# Patient Record
Sex: FEMALE | Race: WHITE | NOT HISPANIC OR LATINO | Employment: FULL TIME | URBAN - METROPOLITAN AREA
[De-identification: names, ages, dates, MRNs, and addresses within clinical notes are randomized per-mention and may not be internally consistent; named-entity substitution may affect disease eponyms.]

---

## 2017-03-01 ENCOUNTER — OFFICE VISIT (OUTPATIENT)
Dept: BARIATRICS/WEIGHT MGMT | Facility: CLINIC | Age: 47
End: 2017-03-01

## 2017-03-01 VITALS
RESPIRATION RATE: 16 BRPM | BODY MASS INDEX: 32.15 KG/M2 | HEIGHT: 65 IN | TEMPERATURE: 98.4 F | SYSTOLIC BLOOD PRESSURE: 114 MMHG | HEART RATE: 69 BPM | WEIGHT: 193 LBS | DIASTOLIC BLOOD PRESSURE: 82 MMHG

## 2017-03-01 DIAGNOSIS — Z71.82 EXERCISE COUNSELING: ICD-10-CM

## 2017-03-01 DIAGNOSIS — E66.9 OBESITY (BMI 30-39.9): Primary | ICD-10-CM

## 2017-03-01 DIAGNOSIS — R53.82 CHRONIC FATIGUE: ICD-10-CM

## 2017-03-01 DIAGNOSIS — R60.9 EDEMA, UNSPECIFIED TYPE: ICD-10-CM

## 2017-03-01 DIAGNOSIS — M25.50 MULTIPLE JOINT PAIN: ICD-10-CM

## 2017-03-01 DIAGNOSIS — K21.9 GASTROESOPHAGEAL REFLUX DISEASE, ESOPHAGITIS PRESENCE NOT SPECIFIED: ICD-10-CM

## 2017-03-01 DIAGNOSIS — Z71.3 DIETARY COUNSELING: ICD-10-CM

## 2017-03-01 PROCEDURE — 99212 OFFICE O/P EST SF 10 MIN: CPT | Performed by: NURSE PRACTITIONER

## 2017-03-01 NOTE — PROGRESS NOTES
[facility]ATR[facility] BARIATRIC SURGERY  [address]  [facility] [address]  [address]  [facility] [address]  Dept: [phone]  3/1/2017      Kymberly Edwards.  [id number]  [id number]  1970  female      Chief Complaint   Patient presents with   • Follow-up     s/p lapband c/o heartburn and fatigue       BH Post-Op Bariatric Surgery:   Kymberly Edwards is status post Lapband procedure (APS), performed on 06/30/14    HPI:   Today's weight is 193 lb (87.5 kg) pounds, today's BMI is Body mass index is 32.12 kg/(m^2). and she has a loss of 13 pounds since the last visit. The patient reports experiencing hunger, but decreased hunger and loss of appetite.     Kymberly Edwards denies nausea, dysphagia or abdominal pain. The patientdoes not have vomiting or regurgitation. The patientdoes not have heartburn/reflux- patient will have symptoms if she misses medication or eats certain foods.    Patient states their portion size is the small plate and their hunger is appropriate.    Diet and Exercise: Diet history reviewed and discussed with the patient. Weight loss/gains to date discussed with the patient. She reports eating 3-4 meals per day, a typical portion size of 1 cup, eating 1 snack per day, drinking 5 8-oz. glasses of water per day. The patient can tolerate solid protein.   The patient is eating protein first. The patient is limiting food volume. The patient is taking vitamins. The patient is limiting snacking. The patient is regular exercise- walking. She is not drinking carbonated beverages.     The following portions of the patient's history were reviewed and updated as appropriate: allergies, current medications, past family history, past medical history, past social history, past surgical history and problem list.    Review of Systems   Constitutional: Positive for fatigue.   Gastrointestinal:        Heartburn   All other systems reviewed and are  negative.      Vitals:    03/01/17 1328   BP: 114/82   Pulse: 69   Resp: 16   Temp: 98.4 °F (36.9 °C)       Physical Exam   Constitutional: She is oriented to person, place, and time. She appears well-developed and well-nourished.   HENT:   Head: Normocephalic and atraumatic.   Eyes: EOM are normal.   Cardiovascular: Normal rate.    Pulmonary/Chest: Effort normal.   Musculoskeletal: Normal range of motion.   Neurological: She is alert and oriented to person, place, and time.   Skin: Skin is warm and dry.   Psychiatric: She has a normal mood and affect. Her behavior is normal. Judgment and thought content normal.   Vitals reviewed.      Assessment: Post-operatively the patient is doing really well .     Plan:   Encouraged patient to keep up the great work. Increase exercise. Avoid foods/habits that contribute to reflux.     No adjustment today as patient has ideal level of restriction. RTC for n/v/d/regurg. Encouraged patient to be sure to eat plenty of dense lean protein and high fiber foods like vegetables and fresh fruits while reducing simple carbohydrates. Reviewed the importance of eating solid foods vs. soft which will contribute to weight gain. Discussed the recommended amount of water to intake daily- half of body weight in ounces. Not drinking with or right after meals.    Activity restrictions: None.   Instructions / Recommendations: dietary counseling recommended, recommended a daily protein intake of  grams, patient was advised that the lap band system works best when consuming solid foods, vitamin supplement(s) recommended, recommended exercising at least 150 minutes per week inclduing both cardio and strength training, behavior modifications recommended and instructed to call the office for concerns, questions, or problems.     The patient was instructed to follow up in 3 months      The patient was counseled regarding. Total time spent face to face was 10 minutes and more than half the time was  spent counseling.

## 2017-06-07 ENCOUNTER — OFFICE VISIT (OUTPATIENT)
Dept: BARIATRICS/WEIGHT MGMT | Facility: CLINIC | Age: 47
End: 2017-06-07

## 2017-06-07 VITALS
DIASTOLIC BLOOD PRESSURE: 77 MMHG | HEIGHT: 65 IN | HEART RATE: 77 BPM | TEMPERATURE: 98.6 F | BODY MASS INDEX: 29.82 KG/M2 | SYSTOLIC BLOOD PRESSURE: 129 MMHG | RESPIRATION RATE: 16 BRPM | WEIGHT: 179 LBS

## 2017-06-07 DIAGNOSIS — R11.2 NAUSEA AND VOMITING, INTRACTABILITY OF VOMITING NOT SPECIFIED, UNSPECIFIED VOMITING TYPE: ICD-10-CM

## 2017-06-07 DIAGNOSIS — Z71.3 DIETARY COUNSELING: ICD-10-CM

## 2017-06-07 DIAGNOSIS — E55.9 VITAMIN D DEFICIENCY: ICD-10-CM

## 2017-06-07 DIAGNOSIS — K21.9 GASTROESOPHAGEAL REFLUX DISEASE, ESOPHAGITIS PRESENCE NOT SPECIFIED: ICD-10-CM

## 2017-06-07 DIAGNOSIS — IMO0002 BODY MASS INDEX (BMI) OF 25.0 TO 29.9: Primary | ICD-10-CM

## 2017-06-07 DIAGNOSIS — F41.9 ANXIETY: ICD-10-CM

## 2017-06-07 DIAGNOSIS — Z71.82 EXERCISE COUNSELING: ICD-10-CM

## 2017-06-07 DIAGNOSIS — R60.9 EDEMA, UNSPECIFIED TYPE: ICD-10-CM

## 2017-06-07 PROCEDURE — S2083 ADJUSTMENT GASTRIC BAND: HCPCS | Performed by: NURSE PRACTITIONER

## 2017-06-07 NOTE — PROGRESS NOTES
[facility] BARIATRIC SURGERY  [address]  [facility] [address] [phone]  [address]  [facility] [id number] [phone]  Dept: [phone]  6/7/2017      Kymberly Edwards.  [id number]  [id number]  1970  female      Chief Complaint   Patient presents with   • Follow-up     s/p lapband c/o n/v and heartburn       BH Post-Op Bariatric Surgery:   Kymberly Edwards is status post Lapband procedure (APS), performed on 6/30/14.     HPI:   Today's weight is 179 lb (81.2 kg)  pounds, today's BMI is Body mass index is 29.79 kg/(m^2). and she has a loss of 14 pounds since the last visit. The patient reports decreased hunger and  loss of appetite.      The patient denies abdominal pain. The patientdoes have vomitng- reports that it has been weeks since this has happened. The patient does have reflux.    Diet and Exercise: Diet history reviewed and discussed with the patient. Weight loss/gains to date discussed with the patient. She reports eating 3 meals per day, a typical portion size of 1 cup, eating 1 snack per day, drinking 4-5 8-oz. glasses of water per day. The patient can tolerate solid protein- but only when chewed for an unreasonable long time.   The patient is eating protein first. The patient is limiting food volume. The patient is not taking vitamins. The patient is limiting snacking. The patient is exercising regularly- walking 3-4 days per week. She is not drinking carbonated beverages.     The following portions of the patient's history were reviewed and updated as appropriate: allergies, current medications, past family history, past medical history, past social history, past surgical history and problem list.    Vitals:    06/07/17 1320   BP: 129/77   Pulse: 77   Resp: 16   Temp: 98.6 °F (37 °C)       Review of Systems    Physical ExamAssessment: Post-operatively the patient is doing well with weight loss but is symptomatic of her band  having too much restriction.      Plan:    My impression is Kymberly Edwards has too much restriction of her band.  I think she would benefit from fluid removal from her band.  Under aseptic conditions, I accessed the port and removed 0.2cc to bring the total band volume to 5.5cc.  She was able to tolerate a glass of water at the conclusion of the fill.  She was advised to consume warm liquids for today and then soft solids for 24 hours before advancing back to a regular diet.   RTC for n/v/d/regurg.     Reviewed the importance of being able to tolerate dense/solid protein and vegetables for weight loss. Discussed eating foods that are easier to tolerate, softer foods, usually contribute to weight gain because they are higher calorie/carb and will not keep patient full for the recommended 3-4 hours.      She should follow-up in 4-6 weeks    UGI ordered today    Activity restrictions: None.   Instructions / Recommendations: dietary counseling recommended, recommended a daily protein intake of  grams, patient was advised that the lap band system works best when consuming solid foods, vitamin supplement(s) recommended, recommended exercising at least 150 minutes per week, behavior modifications recommended and instructed to call the office for concerns, questions, or problems.     The patient was counseled regarding solid protein vs liquid protein supplements, UGI, follow up. Total time of spent face to face was 14 minutes and more than half the time was spent counseling.

## 2017-06-07 NOTE — PATIENT INSTRUCTIONS
Patient to return to clinic for nausea, vomiting, dysphagia, regurgitation or heartburn/reflux. Reviewed tight band symptoms with patient. Encouraged them to increase their lean protein and decrease their carbohydrates. Be sure to drink plenty of water daily and eliminate any liquid calories. Recommended 150 minutes of exercise per week including both cardio and strength training.

## 2017-06-23 ENCOUNTER — HOSPITAL ENCOUNTER (OUTPATIENT)
Dept: GENERAL RADIOLOGY | Facility: HOSPITAL | Age: 47
Discharge: HOME | End: 2017-06-23
Admitting: NURSE PRACTITIONER

## 2017-06-23 DIAGNOSIS — K21.9 GASTROESOPHAGEAL REFLUX DISEASE, ESOPHAGITIS PRESENCE NOT SPECIFIED: ICD-10-CM

## 2017-06-23 DIAGNOSIS — Z71.82 EXERCISE COUNSELING: ICD-10-CM

## 2017-06-23 DIAGNOSIS — F41.9 ANXIETY: ICD-10-CM

## 2017-06-23 DIAGNOSIS — IMO0002 BODY MASS INDEX (BMI) OF 25.0 TO 29.9: ICD-10-CM

## 2017-06-23 DIAGNOSIS — R60.9 EDEMA, UNSPECIFIED TYPE: ICD-10-CM

## 2017-06-23 DIAGNOSIS — R11.2 NAUSEA AND VOMITING, INTRACTABILITY OF VOMITING NOT SPECIFIED, UNSPECIFIED VOMITING TYPE: ICD-10-CM

## 2017-06-23 DIAGNOSIS — E55.9 VITAMIN D DEFICIENCY: ICD-10-CM

## 2017-06-23 DIAGNOSIS — Z71.3 DIETARY COUNSELING: ICD-10-CM

## 2017-06-23 PROCEDURE — 74241: CPT

## 2017-07-10 ENCOUNTER — OFFICE VISIT (OUTPATIENT)
Dept: BARIATRICS/WEIGHT MGMT | Facility: CLINIC | Age: 47
End: 2017-07-10

## 2017-07-10 VITALS
SYSTOLIC BLOOD PRESSURE: 125 MMHG | HEIGHT: 65 IN | HEART RATE: 60 BPM | WEIGHT: 186 LBS | BODY MASS INDEX: 30.99 KG/M2 | DIASTOLIC BLOOD PRESSURE: 82 MMHG | RESPIRATION RATE: 16 BRPM | TEMPERATURE: 98.5 F

## 2017-07-10 DIAGNOSIS — R11.2 NAUSEA AND VOMITING, INTRACTABILITY OF VOMITING NOT SPECIFIED, UNSPECIFIED VOMITING TYPE: ICD-10-CM

## 2017-07-10 DIAGNOSIS — Z98.84 STATUS FOLLOWING GASTRIC BANDING SURGERY FOR WEIGHT LOSS: ICD-10-CM

## 2017-07-10 DIAGNOSIS — Z71.3 DIETARY COUNSELING: ICD-10-CM

## 2017-07-10 DIAGNOSIS — K21.9 GASTROESOPHAGEAL REFLUX DISEASE, ESOPHAGITIS PRESENCE NOT SPECIFIED: Primary | ICD-10-CM

## 2017-07-10 DIAGNOSIS — Z71.82 EXERCISE COUNSELING: ICD-10-CM

## 2017-07-10 DIAGNOSIS — IMO0002 BODY MASS INDEX (BMI) OF 25.0 TO 29.9: ICD-10-CM

## 2017-07-10 DIAGNOSIS — R12 HEARTBURN: ICD-10-CM

## 2017-07-10 PROCEDURE — S2083 ADJUSTMENT GASTRIC BAND: HCPCS | Performed by: NURSE PRACTITIONER

## 2017-07-10 NOTE — PROGRESS NOTES
[facility] BARIATRIC SURGERY  [address]  [facility] [id number] [phone]  [address]  [facility] [id number] [phone]  Dept: [phone]  7/10/2017      Kymberly Edwards.  [id number]  [id number]  1970  female      Chief Complaint   Patient presents with   • Follow-up     s/p lapband needing fluid removal per UGI       BH Post-Op Bariatric Surgery:   Kymberly Edwards is status post Lapband procedure (APS), performed on 6/30/14.     HPI:   Today's weight is 186 lb (84.4 kg)  pounds, today's BMI is Body mass index is 30.95 kg/(m^2). and she has a gain of 7 pounds since the last visit. The patient reports decreased hunger and  loss of appetite.     Patient admits to nausea and dysphagia. The patient denies abdominal pain. The patientdoes not have vomitng. The patientdoes have reflux.    Diet and Exercise: Diet history reviewed and discussed with the patient. Weight loss/gains to date discussed with the patient. She reports eating 3-4 meals per day, a typical portion size of 1 cup, eating 1 snack per day, drinking 5 8-oz. glasses of water per day. The patient can tolerate solid protein.   The patient is eating protein first. The patient is limiting food volume. The patient is not taking vitamins. The patient is limiting snacking. The patient is exercising regularly. She is drinking carbonated beverages.     The following portions of the patient's history were reviewed and updated as appropriate: allergies, current medications, past family history, past medical history, past social history, past surgical history and problem list.    Vitals:    07/10/17 1635   BP: 125/82   Pulse: 60   Resp: 16   Temp: 98.5 °F (36.9 °C)       Review of Systems   Constitutional: Positive for appetite change. Negative for fatigue and unexpected weight change.   HENT: Negative.    Eyes: Negative.    Respiratory: Negative.    Cardiovascular: Negative.  Negative for leg  swelling.   Gastrointestinal: Positive for nausea. Negative for abdominal distention, abdominal pain, constipation, diarrhea and vomiting.   Genitourinary: Negative for difficulty urinating, frequency and urgency.   Musculoskeletal: Negative for back pain.   Skin: Negative.    Psychiatric/Behavioral: Negative.    All other systems reviewed and are negative.      Physical Exam   Constitutional: She appears well-developed and well-nourished.   Cardiovascular: Normal rate, regular rhythm and normal heart sounds.    Pulmonary/Chest: Effort normal and breath sounds normal. No respiratory distress. She has no wheezes.   Abdominal: Soft. Bowel sounds are normal. She exhibits no distension. There is no tenderness. There is no guarding. No hernia.   Musculoskeletal: She exhibits no edema or tenderness.   Neurological: She is alert.   Skin: Skin is warm and dry. No rash noted. No erythema.   Psychiatric: She has a normal mood and affect. Her behavior is normal.   Nursing note and vitals reviewed.      Assessment: Post-operatively the patient is doing well but is symptomatic of having too much restriction in her band.     Encounter Diagnoses   Name Primary?   • Gastroesophageal reflux disease, esophagitis presence not specified Yes   • Heartburn    • Nausea and vomiting, intractability of vomiting not specified, unspecified vomiting type    • Body mass index (BMI) of 25.0 to 29.9    • Dietary counseling    • Exercise counseling    • Status following gastric banding surgery for weight loss        Plan:    Due to her recent UGI, my impression is Kymberly Edwards has too much restriction of her band.  I think she would benefit from fluid removal from her band.  Under aseptic conditions, I accessed the port and removed 0.8cc to bring the total band volume to 5cc.  She was able to tolerate a glass of water at the conclusion of the fill.  She was advised to consume warm liquids for today and then soft solids for 24 hours before  advancing back to a regular diet.   RTC for n/v/d/regurg.     Reviewed the importance of being able to tolerate dense/solid protein and vegetables for weight loss. Discussed eating foods that are easier to tolerate, softer foods, usually contribute to weight gain because they are higher calorie/carb and will not keep patient full for the recommended 3-4 hours.      She should follow-up in 4-6 weeks.        Activity restrictions: None.   Instructions / Recommendations: dietary counseling recommended, recommended a daily protein intake of  grams, patient was advised that the lap band system works best when consuming solid foods, vitamin supplement(s) recommended, recommended exercising at least 150 minutes per week, behavior modifications recommended and instructed to call the office for concerns, questions, or problems.     The patient was counseled regarding protein intake, signs of too much restriction, red yellow and green zone symptoms related to lap band restriction, diet progression following band adjustment. Total time of spent face to face was 20 minutes and more than half the time was spent counseling.

## 2017-10-19 ENCOUNTER — OFFICE VISIT (OUTPATIENT)
Dept: BARIATRICS/WEIGHT MGMT | Facility: CLINIC | Age: 47
End: 2017-10-19

## 2017-10-19 VITALS
RESPIRATION RATE: 16 BRPM | BODY MASS INDEX: 34.32 KG/M2 | HEIGHT: 65 IN | DIASTOLIC BLOOD PRESSURE: 87 MMHG | HEART RATE: 72 BPM | WEIGHT: 206 LBS | TEMPERATURE: 98.3 F | SYSTOLIC BLOOD PRESSURE: 124 MMHG

## 2017-10-19 DIAGNOSIS — E66.9 OBESITY, CLASS I, BMI 30-34.9: Primary | ICD-10-CM

## 2017-10-19 DIAGNOSIS — Z71.82 EXERCISE COUNSELING: ICD-10-CM

## 2017-10-19 DIAGNOSIS — Z71.3 DIETARY COUNSELING: ICD-10-CM

## 2017-10-19 DIAGNOSIS — K21.9 GASTROESOPHAGEAL REFLUX DISEASE, ESOPHAGITIS PRESENCE NOT SPECIFIED: ICD-10-CM

## 2017-10-19 DIAGNOSIS — R63.2 POLYPHAGIA: ICD-10-CM

## 2017-10-19 PROBLEM — E66.811 OBESITY, CLASS I, BMI 30-34.9: Status: ACTIVE | Noted: 2017-03-01

## 2017-10-19 PROBLEM — R11.2 NAUSEA & VOMITING: Status: RESOLVED | Noted: 2017-06-07 | Resolved: 2017-10-19

## 2017-10-19 PROCEDURE — 99024 POSTOP FOLLOW-UP VISIT: CPT | Performed by: NURSE PRACTITIONER

## 2017-11-29 ENCOUNTER — OFFICE VISIT (OUTPATIENT)
Dept: BARIATRICS/WEIGHT MGMT | Facility: CLINIC | Age: 47
End: 2017-11-29

## 2017-11-29 VITALS
HEIGHT: 65 IN | TEMPERATURE: 98.6 F | HEART RATE: 77 BPM | SYSTOLIC BLOOD PRESSURE: 131 MMHG | BODY MASS INDEX: 34.32 KG/M2 | RESPIRATION RATE: 16 BRPM | WEIGHT: 206 LBS | DIASTOLIC BLOOD PRESSURE: 91 MMHG

## 2017-11-29 DIAGNOSIS — Z71.3 DIETARY COUNSELING: ICD-10-CM

## 2017-11-29 DIAGNOSIS — M25.50 MULTIPLE JOINT PAIN: ICD-10-CM

## 2017-11-29 DIAGNOSIS — Z71.82 EXERCISE COUNSELING: ICD-10-CM

## 2017-11-29 DIAGNOSIS — E66.9 OBESITY, CLASS I, BMI 30-34.9: Primary | ICD-10-CM

## 2017-11-29 DIAGNOSIS — K21.9 GASTROESOPHAGEAL REFLUX DISEASE, ESOPHAGITIS PRESENCE NOT SPECIFIED: ICD-10-CM

## 2017-11-29 PROCEDURE — S2083 ADJUSTMENT GASTRIC BAND: HCPCS | Performed by: NURSE PRACTITIONER

## 2017-11-29 NOTE — PROGRESS NOTES
[facility] BAR[facility]ATR[facility] BARIATRIC SURGERY  [address]  [facility] [id number] [phone]  [address]  [facility] [id number] [phone]  Dept: [phone]  11/29/2017      Kymberly Edwards.  [id number]  [id number]  1970  female      Chief Complaint   Patient presents with   • Follow-up     Followup AGB (5.4 mL total)       BH Post-Op Bariatric Surgery:   Kymberly Edwards is status post Lapband procedure (APS), performed on 6/30/14.     HPI:   Today's weight is 206 lb (93.4 kg)  pounds, today's BMI is Body mass index is 34.28 kg/(m^2). and she has a loss of 0 pounds since the last visit. The patient reports a portion size larger than the small plate, increased hunger and denies a loss of appetite.     Kymberly Edwards denies nausea, dysphagia, or abdominal pain. The patientdoes not have vomitng. The patientdoes not have reflux.    Diet and Exercise: Diet history reviewed and discussed with the patient. Weight loss/gains to date discussed with the patient. She reports eating 3-4 meals per day, a typical portion size of greater than 1 cup, eating 1 snack per day, drinking 4-5 8-oz. glasses of water per day. The patient can tolerate solid protein.   The patient is eating protein first. The patient is limiting food volume. The patient is taking vitamins. The patient is limiting snacking. The patient is not exercising regularly. She is not drinking carbonated beverages.     The following portions of the patient's history were reviewed and updated as appropriate: allergies, current medications, past family history, past medical history, past social history, past surgical history and problem list.    Vitals:    11/29/17 1404   BP: 131/91   Pulse: 77   Resp: 16   Temp: 98.6 °F (37 °C)       Review of Systems   Constitutional: Positive for appetite change. Negative for fatigue and unexpected weight change.   HENT: Negative.    Eyes: Negative.    Respiratory: Negative.     Cardiovascular: Negative.  Negative for leg swelling.   Gastrointestinal: Negative for abdominal distention, abdominal pain, constipation, diarrhea, nausea and vomiting.   Genitourinary: Negative for difficulty urinating, frequency and urgency.   Musculoskeletal: Negative for back pain.   Skin: Negative.    Psychiatric/Behavioral: Negative.    All other systems reviewed and are negative.      Physical Exam   Constitutional: She appears well-developed and well-nourished.   Neck: No thyromegaly present.   Cardiovascular: Normal rate, regular rhythm and normal heart sounds.    Pulmonary/Chest: Effort normal and breath sounds normal. No respiratory distress. She has no wheezes.   Abdominal: Soft. Bowel sounds are normal. She exhibits no distension. There is no tenderness. There is no guarding. No hernia.   Musculoskeletal: She exhibits no edema or tenderness.   Neurological: She is alert.   Skin: Skin is warm and dry. No rash noted. No erythema.   Psychiatric: She has a normal mood and affect. Her behavior is normal.   Nursing note and vitals reviewed.      Assessment: Post-operatively the patient is doing well    Encounter Diagnoses   Name Primary?   • Obesity, Class I, BMI 30-34.9 Yes   • Gastroesophageal reflux disease, esophagitis presence not specified    • Multiple joint pain    • Dietary counseling    • Exercise counseling        Plan:     I think she would benefit from additional band restriction.  Under aseptic conditions, I accessed the port and 0.2mls of fluid were added for a total of 5.6mls.  She was able to tolerate a glass of water at the conclusion of the fill.  She was advised to consume soft solids for 24 hours before advancing back to a regular diet.  RTC for n/v/d/regurg.     We discussed her protein sources and that eating dense solid protein along with plenty of vegetables and fresh fruits is important for weight loss. Reviewed appropriate water intake- half of body weight in ounces and exercise  routine- minimum of 150 minutes per with including both cardio and strength training. Instructed not to drink with meals and wait 45 minutes after each meal before drinking.    She should follow-up in 4-6 weeks       Activity restrictions: None.   Instructions / Recommendations: dietary counseling recommended, recommended a daily protein intake of  grams, patient was advised that the lap band system works best when consuming solid foods, vitamin supplement(s) recommended, recommended exercising at least 150 minutes per week, behavior modifications recommended and instructed to call the office for concerns, questions, or problems.

## 2018-01-10 ENCOUNTER — OFFICE VISIT (OUTPATIENT)
Dept: BARIATRICS/WEIGHT MGMT | Facility: CLINIC | Age: 48
End: 2018-01-10

## 2018-01-10 VITALS
DIASTOLIC BLOOD PRESSURE: 81 MMHG | RESPIRATION RATE: 16 BRPM | TEMPERATURE: 98.7 F | HEART RATE: 81 BPM | HEIGHT: 65 IN | BODY MASS INDEX: 33.32 KG/M2 | SYSTOLIC BLOOD PRESSURE: 119 MMHG | WEIGHT: 200 LBS

## 2018-01-10 DIAGNOSIS — E55.9 VITAMIN D DEFICIENCY: ICD-10-CM

## 2018-01-10 DIAGNOSIS — R11.11 VOMITING WITHOUT NAUSEA, INTRACTABILITY OF VOMITING NOT SPECIFIED, UNSPECIFIED VOMITING TYPE: Primary | ICD-10-CM

## 2018-01-10 DIAGNOSIS — Z71.3 DIETARY COUNSELING: ICD-10-CM

## 2018-01-10 DIAGNOSIS — K21.9 GASTROESOPHAGEAL REFLUX DISEASE, ESOPHAGITIS PRESENCE NOT SPECIFIED: ICD-10-CM

## 2018-01-10 DIAGNOSIS — E66.9 OBESITY, CLASS I, BMI 30-34.9: ICD-10-CM

## 2018-01-10 PROBLEM — R11.10 VOMITING: Status: ACTIVE | Noted: 2018-01-10

## 2018-01-10 PROCEDURE — S2083 ADJUSTMENT GASTRIC BAND: HCPCS | Performed by: NURSE PRACTITIONER

## 2018-01-10 NOTE — PROGRESS NOTES
[facility]ATR[facility] BARIATRIC SURGERY  [address]  [facility] [id number] [phone]  [address]  [facility] [address] [phone]  Dept: [phone]  1/10/2018      Kymberly Edwards.  [id number]  [id number]  1970  female      Chief Complaint   Patient presents with   • Follow-up     Followup AGB (5.6 mL)       BH Post-Op Bariatric Surgery:   Kymberly Edwards is status post Lapband procedure (APS), performed on 6/30/2014.     HPI:   Today's weight is 90.7 kg (200 lb)  pounds, today's BMI is Body mass index is 33.28 kg/(m^2). and she has a loss of 5 pounds since the last visit. The patient reports decreased hunger and  loss of appetite.     Patient admits to nausea and dysphagia. The patient denies abdominal pain. The patientdoes have vomitng. The patientdoes have reflux. Patient reports that she had a GI bug over new years and although her infectious symptoms have resolved, she is refluxing food every 4 days or so now and having more heartburn.     Diet and Exercise: Diet history reviewed and discussed with the patient. Weight loss/gains to date discussed with the patient. She reports eating 2-3 meals per day, a typical portion size of 1/2 cup, eating 1 snack per day, drinking 6 8-oz. glasses of water per day. The patient can tolerate solid protein.   The patient is eating protein first. The patient is limiting food volume. The patient is taking vitamins. The patient is limiting snacking. The patient is not exercising regularly. She is not drinking carbonated beverages.     The following portions of the patient's history were reviewed and updated as appropriate: allergies, current medications, past family history, past medical history, past social history, past surgical history and problem list.    Vitals:    01/10/18 1401   BP: 119/81   Pulse: 81   Resp: 16   Temp: 98.7 °F (37.1 °C)       Review of Systems   Constitutional: Positive for appetite  change. Negative for fatigue and unexpected weight change.   HENT: Negative.         Heartburn   Eyes: Negative.    Respiratory: Negative.    Cardiovascular: Negative.  Negative for leg swelling.   Gastrointestinal: Positive for vomiting. Negative for abdominal distention, abdominal pain, constipation, diarrhea and nausea.   Genitourinary: Negative for difficulty urinating, frequency and urgency.   Musculoskeletal: Negative for back pain.   Skin: Negative.    Psychiatric/Behavioral: Negative.    All other systems reviewed and are negative.      Physical Exam   Constitutional: She appears well-developed and well-nourished.   Neck: No thyromegaly present.   Cardiovascular: Normal rate, regular rhythm and normal heart sounds.    Pulmonary/Chest: Effort normal and breath sounds normal. No respiratory distress. She has no wheezes.   Abdominal: Soft. Bowel sounds are normal. She exhibits no distension. There is no tenderness. There is no guarding. No hernia.   Musculoskeletal: She exhibits no edema or tenderness.   Neurological: She is alert.   Skin: Skin is warm and dry. No rash noted. No erythema.   Psychiatric: She has a normal mood and affect. Her behavior is normal.   Nursing note and vitals reviewed.      Assessment: Post-operatively the patient is symptomatic of too much restriction in her band    Encounter Diagnoses   Name Primary?   • Vomiting without nausea, intractability of vomiting not specified, unspecified vomiting type Yes   • Obesity, Class I, BMI 30-34.9    • Vitamin D deficiency    • Gastroesophageal reflux disease, esophagitis presence not specified    • Dietary counseling        Plan:    My impression is Kymberly Edwards has too much restriction of her band.  I think she would benefit from fluid removal from her band.  Under aseptic conditions, I accessed the port and removed 0.4cc to bring the total band volume to 5.2cc.  She was able to tolerate a glass of water at the conclusion of the fill.  She was  advised to consume warm liquids for today and then soft solids for 24 hours before advancing back to a regular diet.   RTC for n/v/d/regurg.     Reviewed the importance of being able to tolerate dense/solid protein and vegetables for weight loss. Discussed eating foods that are easier to tolerate, softer foods, usually contribute to weight gain because they are higher calorie/carb and will not keep patient full for the recommended 3-4 hours.      She should follow-up in 4-6 weeks.      Activity restrictions: None.   Instructions / Recommendations: dietary counseling recommended, recommended a daily protein intake of  grams, patient was advised that the lap band system works best when consuming solid foods, vitamin supplement(s) recommended, recommended exercising at least 150 minutes per week, behavior modifications recommended and instructed to call the office for concerns, questions, or problems.

## 2018-02-21 ENCOUNTER — OFFICE VISIT (OUTPATIENT)
Dept: BARIATRICS/WEIGHT MGMT | Facility: CLINIC | Age: 48
End: 2018-02-21

## 2018-02-21 VITALS
DIASTOLIC BLOOD PRESSURE: 84 MMHG | BODY MASS INDEX: 34.32 KG/M2 | RESPIRATION RATE: 16 BRPM | TEMPERATURE: 98.6 F | WEIGHT: 206 LBS | HEART RATE: 76 BPM | HEIGHT: 65 IN | SYSTOLIC BLOOD PRESSURE: 119 MMHG

## 2018-02-21 DIAGNOSIS — R63.2 POLYPHAGIA: ICD-10-CM

## 2018-02-21 DIAGNOSIS — E66.9 OBESITY, CLASS I, BMI 30-34.9: Primary | ICD-10-CM

## 2018-02-21 DIAGNOSIS — K21.9 GASTROESOPHAGEAL REFLUX DISEASE, ESOPHAGITIS PRESENCE NOT SPECIFIED: ICD-10-CM

## 2018-02-21 DIAGNOSIS — Z71.3 DIETARY COUNSELING: ICD-10-CM

## 2018-02-21 DIAGNOSIS — M25.50 MULTIPLE JOINT PAIN: ICD-10-CM

## 2018-02-21 DIAGNOSIS — Z71.82 EXERCISE COUNSELING: ICD-10-CM

## 2018-02-21 PROCEDURE — 99213 OFFICE O/P EST LOW 20 MIN: CPT | Performed by: NURSE PRACTITIONER

## 2018-02-21 NOTE — PROGRESS NOTES
[facility] BARIATRIC [facility] BARIATRIC SURGERY  [address]  [facility] [address] [phone]  [address]  [facility] [address] [phone]  Dept: [phone]  2/21/2018      Kymberly Edwards.  [id number]  [id number]  1970  female      Chief Complaint   Patient presents with   • Follow-up     Followup AGB (5.2 mL)       BH Post-Op Bariatric Surgery:   Kymberly Edwards is status post Lapband procedure (APS), performed on 6/30/14     HPI:   Today's weight is 93.4 kg (206 lb) pounds, today's BMI is Body mass index is 34.28 kg/(m^2). and she has a gain of 8 pounds since the last visit. The patient reports experiencing hunger, but decreased hunger and loss of appetite.     Kymberly Edwards denies nausea, dysphagia or abdominal pain. The patientdoes not have vomiting or regurgitation. The patientdoes not have heartburn/reflux. Patient reports that she feels she is at her ideal level of restriction. Reports eating a small plate of food at meal times with 1,8oz serving of meat and 1/2 cup to 1 cup of high fiber side dish. Reports that all symptoms of too much restriction have resolved after fluid removal last visit.       Patient states their portion size is the small plate and their hunger is appropriate.    Diet and Exercise: Diet history reviewed and discussed with the patient. Weight loss/gains to date discussed with the patient. She reports eating 2-3 meals per day, a typical portion size of 1/2 cup cup, eating 1-2 snack per day, drinking 5-6 8-oz. glasses of water per day. The patient can tolerate solid protein.   The patient is eating protein first. The patient is limiting food volume. The patient is taking vitamins. The patient is limiting snacking. The patient is regular exercise- walking 1 day per week. She is not drinking carbonated beverages.     The following portions of the patient's history were reviewed and updated as appropriate: allergies, current  medications, past family history, past medical history, past social history, past surgical history and problem list.    Review of Systems   Constitutional: Positive for appetite change. Negative for fatigue and unexpected weight change.   HENT: Negative.    Eyes: Negative.    Respiratory: Negative.    Cardiovascular: Negative.  Negative for leg swelling.   Gastrointestinal: Negative for abdominal distention, abdominal pain, constipation, diarrhea, nausea and vomiting.   Genitourinary: Negative for difficulty urinating, frequency and urgency.   Musculoskeletal: Negative for back pain.   Skin: Negative.    Psychiatric/Behavioral: Negative.    All other systems reviewed and are negative.      Vitals:    02/21/18 1323   BP: 119/84   Pulse: 76   Resp: 16   Temp: 98.6 °F (37 °C)       Physical Exam   Constitutional: She appears well-developed and well-nourished.   Neck: No thyromegaly present.   Cardiovascular: Normal rate, regular rhythm, normal heart sounds and intact distal pulses.    Pulmonary/Chest: Effort normal and breath sounds normal. No respiratory distress. She has no wheezes.   Abdominal: Soft. Bowel sounds are normal. She exhibits no distension. There is no tenderness. There is no guarding. No hernia.   Musculoskeletal: She exhibits no edema or tenderness.   Neurological: She is alert.   Skin: Skin is warm, dry and intact. No rash noted. No erythema.   Psychiatric: She has a normal mood and affect. Her behavior is normal.   Nursing note and vitals reviewed.        Assessment: Post-operatively the patient is doing well.     Encounter Diagnoses   Name Primary?   • Obesity, Class I, BMI 30-34.9 Yes   • Gastroesophageal reflux disease, esophagitis presence not specified    • Multiple joint pain    • Dietary counseling    • Exercise counseling    • Polyphagia        Plan:    Patient encouraged to keep up the good work with protein intake, portion sizes, and fluid intake. We discussed bowel regiment and adding  mirilax daily until her constipation has resolved as well as increasing her daily fluid intake. Discussed increasing frequency of her walks and ways in which she might try getting some strength training in her regimen  No adjustment today as patient has ideal level of restriction. RTC for n/v/d/regurg. Encouraged patient to be sure to eat plenty of dense lean protein and high fiber foods like vegetables and fresh fruits while reducing simple carbohydrates. Reviewed the importance of eating solid foods vs. soft which will contribute to weight gain. Discussed the recommended amount of water to intake daily- half of body weight in ounces. Not drinking with or right after meals.    Activity restrictions: None.   Instructions / Recommendations: dietary counseling recommended, recommended a daily protein intake of  grams, patient was advised that the lap band system works best when consuming solid foods, vitamin supplement(s) recommended, recommended exercising at least 150 minutes per week inclduing both cardio and strength training, behavior modifications recommended and instructed to call the office for concerns, questions, or problems.     The patient was instructed to follow up in 1-2 months      The patient was counseled regarding exercise, protein intake, meal frequency, portion sizes, bowel regimen. Total time spent face to face was 20 minutes and 15 minutes was spent counseling.

## 2018-05-16 ENCOUNTER — OFFICE VISIT (OUTPATIENT)
Dept: BARIATRICS/WEIGHT MGMT | Facility: CLINIC | Age: 48
End: 2018-05-16

## 2018-05-16 VITALS
WEIGHT: 212 LBS | DIASTOLIC BLOOD PRESSURE: 89 MMHG | BODY MASS INDEX: 35.32 KG/M2 | SYSTOLIC BLOOD PRESSURE: 127 MMHG | TEMPERATURE: 98.7 F | HEART RATE: 76 BPM | HEIGHT: 65 IN | RESPIRATION RATE: 16 BRPM

## 2018-05-16 DIAGNOSIS — K21.9 GASTROESOPHAGEAL REFLUX DISEASE, ESOPHAGITIS PRESENCE NOT SPECIFIED: ICD-10-CM

## 2018-05-16 DIAGNOSIS — M25.50 MULTIPLE JOINT PAIN: ICD-10-CM

## 2018-05-16 DIAGNOSIS — R63.2 POLYPHAGIA: ICD-10-CM

## 2018-05-16 DIAGNOSIS — Z71.3 DIETARY COUNSELING: ICD-10-CM

## 2018-05-16 DIAGNOSIS — Z71.82 EXERCISE COUNSELING: ICD-10-CM

## 2018-05-16 DIAGNOSIS — E66.9 OBESITY, CLASS I, BMI 30-34.9: Primary | ICD-10-CM

## 2018-05-16 PROCEDURE — S2083 ADJUSTMENT GASTRIC BAND: HCPCS | Performed by: NURSE PRACTITIONER

## 2018-05-16 NOTE — PROGRESS NOTES
[facility] BARIATRIC [facility] BARIATRIC SURGERY  [address]  [facility] [id number] [phone]  [address]  [facility] [id number] [phone]  Dept: [phone]  5/16/2018      Kymberly Edwards.  [id number]  [id number]  1970  female      Chief Complaint   Patient presents with   • Follow-up     Followup AGB (5.2 mL)       BH Post-Op Bariatric Surgery:   Kymberly Edwards is status post Lapband procedure APS, performed on 6/30/14.     HPI:   Today's weight is 96.2 kg (212 lb)  pounds, today's BMI is Body mass index is 35.28 kg/m². and she has a gain of 6 pounds since the last visit. The patient reports a portion size larger than the small plate, increased hunger and denies a loss of appetite.     Kymberly Edwards denies nausea, dysphagia, or abdominal pain. The patientdoes not have vomitng. The patientdoes not have reflux.    Diet and Exercise: Diet history reviewed and discussed with the patient. Weight loss/gains to date discussed with the patient. She reports eating 3 meals per day, a typical portion size of greater than 1 cup, eating 1 snack per day, drinking 4-6 8-oz. glasses of water per day. The patient can tolerate solid protein.   The patient is not eating protein first. The patient is limiting food volume. The patient is not taking vitamins. The patient is limiting snacking. The patient is not exercising regularly. She is drinking carbonated beverages.     The following portions of the patient's history were reviewed and updated as appropriate: allergies, current medications, past family history, past medical history, past social history, past surgical history and problem list.    Vitals:    05/16/18 1530   BP: 127/89   Pulse: 76   Resp: 16   Temp: 98.7 °F (37.1 °C)       Review of Systems   Constitutional: Positive for appetite change. Negative for fatigue and unexpected weight change.   HENT: Negative.    Eyes: Negative.    Respiratory: Negative.     Cardiovascular: Negative.  Negative for leg swelling.   Gastrointestinal: Negative for abdominal distention, abdominal pain, constipation, diarrhea, nausea and vomiting.   Genitourinary: Negative for difficulty urinating, frequency and urgency.   Musculoskeletal: Negative for back pain.   Skin: Negative.    Psychiatric/Behavioral: Negative.    All other systems reviewed and are negative.      Physical Exam   Constitutional: She appears well-developed and well-nourished.   Neck: No thyromegaly present.   Cardiovascular: Normal rate, regular rhythm and normal heart sounds.    Pulmonary/Chest: Effort normal and breath sounds normal. No respiratory distress. She has no wheezes.   Abdominal: Soft. Bowel sounds are normal. She exhibits no distension. There is no tenderness. There is no guarding. No hernia.   Musculoskeletal: She exhibits no edema or tenderness.   Neurological: She is alert.   Skin: Skin is warm and dry. No rash noted. No erythema.   Psychiatric: She has a normal mood and affect. Her behavior is normal.   Nursing note and vitals reviewed.      Assessment: Post-operatively the patient is doing well. We discussed cutting back on carbs and processed foods and getting more exercise.    Encounter Diagnoses   Name Primary?   • Obesity, Class I, BMI 30-34.9 Yes   • Gastroesophageal reflux disease, esophagitis presence not specified    • Multiple joint pain    • Dietary counseling    • Exercise counseling    • Polyphagia        Plan:     I think she would benefit from additional band restriction.  Under aseptic conditions, I accessed the port and 0.2mls of fluid were added for a total of 6.4mls.  She was able to tolerate a glass of water at the conclusion of the fill.  She was advised to consume soft solids for 24 hours before advancing back to a regular diet.  RTC for n/v/d/regurg.     We discussed her protein sources and that eating dense solid protein along with plenty of vegetables and fresh fruits is  important for weight loss. Reviewed appropriate water intake- half of body weight in ounces and exercise routine- minimum of 150 minutes per with including both cardio and strength training. Instructed not to drink with meals and wait 45 minutes after each meal before drinking.    She should follow-up in 4-6 weeks       Activity restrictions: None.   Instructions / Recommendations: dietary counseling recommended, recommended a daily protein intake of  grams, patient was advised that the lap band system works best when consuming solid foods, vitamin supplement(s) recommended, recommended exercising at least 150 minutes per week, behavior modifications recommended and instructed to call the office for concerns, questions, or problems.

## 2018-07-17 ENCOUNTER — OFFICE VISIT (OUTPATIENT)
Dept: BARIATRICS/WEIGHT MGMT | Facility: CLINIC | Age: 48
End: 2018-07-17

## 2018-07-17 VITALS
WEIGHT: 219 LBS | BODY MASS INDEX: 36.49 KG/M2 | DIASTOLIC BLOOD PRESSURE: 89 MMHG | SYSTOLIC BLOOD PRESSURE: 138 MMHG | HEIGHT: 65 IN | HEART RATE: 71 BPM | TEMPERATURE: 98.5 F | RESPIRATION RATE: 16 BRPM

## 2018-07-17 DIAGNOSIS — M25.50 MULTIPLE JOINT PAIN: ICD-10-CM

## 2018-07-17 DIAGNOSIS — Z71.3 DIETARY COUNSELING: ICD-10-CM

## 2018-07-17 DIAGNOSIS — Z71.82 EXERCISE COUNSELING: ICD-10-CM

## 2018-07-17 DIAGNOSIS — K21.9 GASTROESOPHAGEAL REFLUX DISEASE, ESOPHAGITIS PRESENCE NOT SPECIFIED: ICD-10-CM

## 2018-07-17 DIAGNOSIS — E66.9 OBESITY, CLASS I, BMI 30-34.9: Primary | ICD-10-CM

## 2018-07-17 PROCEDURE — S2083 ADJUSTMENT GASTRIC BAND: HCPCS | Performed by: NURSE PRACTITIONER

## 2018-07-17 NOTE — PROGRESS NOTES
[facility] BARIATRIC [facility] BARIATRIC SURGERY  [address]  [facility] [address] [phone]  [address]  [facility] [address] [phone]  Dept: [phone]  7/17/2018      Kymberly Edwards.  [id number]  [id number]  1970  female      Chief Complaint   Patient presents with   • Follow-up     Band follow up       BH Post-Op Bariatric Surgery:   Kymberly Edwards is status post Lapband procedure APS, performed on 6/30/14 with 6.8    HPI:   Today's weight is 99.3 kg (219 lb)  pounds, today's BMI is Body mass index is 36.44 kg/m². and she has a gain of 7 pounds since the last visit. The patient reports a portion size larger than the small plate, increased hunger and denies a loss of appetite.     Kymberly Edwards denies nausea, dysphagia, or abdominal pain. The patientdoes not have vomitng. The patientdoes not have reflux.    Diet and Exercise: Diet history reviewed and discussed with the patient. Weight loss/gains to date discussed with the patient. She reports eating 3-4 meals per day, a typical portion size of greater than 1 cup, eating 1 snack per day, drinking 4-5 8-oz. glasses of water per day. The patient can tolerate solid protein.   The patient is eating protein first. The patient is limiting food volume. The patient is taking vitamins. The patient is limiting snacking. The patient is not exercising regularly. She is not drinking carbonated beverages but is eating sweets.     The following portions of the patient's history were reviewed and updated as appropriate: allergies, current medications, past family history, past medical history, past social history, past surgical history and problem list.    Vitals:    07/17/18 1332   BP: 138/89   Pulse: 71   Resp: 16   Temp: 98.5 °F (36.9 °C)       Review of Systems   Constitutional: Positive for appetite change. Negative for fatigue and unexpected weight change.   HENT: Negative.    Eyes: Negative.    Respiratory:  Negative.    Cardiovascular: Negative.  Negative for leg swelling.   Gastrointestinal: Negative for abdominal distention, abdominal pain, constipation, diarrhea, nausea and vomiting.   Genitourinary: Negative for difficulty urinating, frequency and urgency.   Musculoskeletal: Negative for back pain.   Skin: Negative.    Psychiatric/Behavioral: Negative.    All other systems reviewed and are negative.      Physical Exam   Constitutional: She appears well-developed and well-nourished.   Neck: No thyromegaly present.   Cardiovascular: Normal rate, regular rhythm and normal heart sounds.    Pulmonary/Chest: Effort normal and breath sounds normal. No respiratory distress. She has no wheezes.   Abdominal: Soft. Bowel sounds are normal. She exhibits no distension. There is no tenderness. There is no guarding. No hernia.   Musculoskeletal: She exhibits no edema or tenderness.   Neurological: She is alert.   Skin: Skin is warm and dry. No rash noted. No erythema.   Psychiatric: She has a normal mood and affect. Her behavior is normal.   Nursing note and vitals reviewed.      Assessment: Post-operatively the patient is doing well    Encounter Diagnoses   Name Primary?   • Obesity, Class I, BMI 30-34.9 Yes   • Gastroesophageal reflux disease, esophagitis presence not specified    • Multiple joint pain    • Dietary counseling    • Exercise counseling        Plan:     I think she would benefit from additional band restriction.  Under aseptic conditions, I accessed the port and 0.4mls of fluid were added for a total of 6.8mls.  She was able to tolerate a glass of water at the conclusion of the fill.  She was advised to consume soft solids for 24 hours before advancing back to a regular diet.  RTC for n/v/d/regurg.     We discussed her protein sources and that eating dense solid protein along with plenty of vegetables and fresh fruits is important for weight loss. Reviewed appropriate water intake- half of body weight in ounces  and exercise routine- minimum of 150 minutes per with including both cardio and strength training. Instructed not to drink with meals and wait 45 minutes after each meal before drinking.    She should follow-up in 4-6 weeks       Activity restrictions: None.   Instructions / Recommendations: dietary counseling recommended, recommended a daily protein intake of  grams, patient was advised that the lap band system works best when consuming solid foods, vitamin supplement(s) recommended, recommended exercising at least 150 minutes per week, behavior modifications recommended and instructed to call the office for concerns, questions, or problems.

## 2018-08-31 ENCOUNTER — OFFICE VISIT (OUTPATIENT)
Dept: BARIATRICS/WEIGHT MGMT | Facility: CLINIC | Age: 48
End: 2018-08-31

## 2018-08-31 VITALS
WEIGHT: 209.5 LBS | SYSTOLIC BLOOD PRESSURE: 127 MMHG | HEART RATE: 64 BPM | RESPIRATION RATE: 16 BRPM | DIASTOLIC BLOOD PRESSURE: 81 MMHG | TEMPERATURE: 98.4 F | BODY MASS INDEX: 34.91 KG/M2 | HEIGHT: 65 IN

## 2018-08-31 DIAGNOSIS — E03.9 HYPOTHYROIDISM, UNSPECIFIED TYPE: ICD-10-CM

## 2018-08-31 DIAGNOSIS — Z71.3 DIETARY COUNSELING: ICD-10-CM

## 2018-08-31 DIAGNOSIS — K21.9 GASTROESOPHAGEAL REFLUX DISEASE, ESOPHAGITIS PRESENCE NOT SPECIFIED: ICD-10-CM

## 2018-08-31 DIAGNOSIS — M25.50 MULTIPLE JOINT PAIN: ICD-10-CM

## 2018-08-31 DIAGNOSIS — Z71.82 EXERCISE COUNSELING: ICD-10-CM

## 2018-08-31 DIAGNOSIS — E66.9 OBESITY, CLASS I, BMI 30-34.9: Primary | ICD-10-CM

## 2018-08-31 PROBLEM — R11.10 VOMITING: Status: RESOLVED | Noted: 2018-01-10 | Resolved: 2018-08-31

## 2018-08-31 PROBLEM — R12 HEARTBURN: Status: RESOLVED | Noted: 2017-07-10 | Resolved: 2018-08-31

## 2018-08-31 PROBLEM — R63.2 POLYPHAGIA: Status: RESOLVED | Noted: 2017-10-19 | Resolved: 2018-08-31

## 2018-08-31 PROCEDURE — 99213 OFFICE O/P EST LOW 20 MIN: CPT | Performed by: NURSE PRACTITIONER

## 2018-08-31 RX ORDER — LEVOTHYROXINE SODIUM 0.03 MG/1
50 TABLET ORAL DAILY
COMMUNITY
Start: 2018-08-27 | End: 2020-04-17 | Stop reason: DRUGHIGH

## 2018-10-03 ENCOUNTER — OFFICE VISIT (OUTPATIENT)
Dept: OBSTETRICS AND GYNECOLOGY | Age: 48
End: 2018-10-03

## 2018-10-03 VITALS
HEIGHT: 65 IN | DIASTOLIC BLOOD PRESSURE: 74 MMHG | WEIGHT: 205 LBS | BODY MASS INDEX: 34.16 KG/M2 | SYSTOLIC BLOOD PRESSURE: 110 MMHG

## 2018-10-03 DIAGNOSIS — Z01.419 WELL WOMAN EXAM WITH ROUTINE GYNECOLOGICAL EXAM: Primary | ICD-10-CM

## 2018-10-03 DIAGNOSIS — Z12.4 SCREENING FOR MALIGNANT NEOPLASM OF CERVIX: ICD-10-CM

## 2018-10-03 DIAGNOSIS — Z12.31 VISIT FOR SCREENING MAMMOGRAM: ICD-10-CM

## 2018-10-03 DIAGNOSIS — Z11.51 SCREENING FOR HPV (HUMAN PAPILLOMAVIRUS): ICD-10-CM

## 2018-10-03 DIAGNOSIS — N95.1 PERIMENOPAUSE: ICD-10-CM

## 2018-10-03 DIAGNOSIS — Z13.89 SCREENING FOR HEMATURIA OR PROTEINURIA: ICD-10-CM

## 2018-10-03 LAB
BILIRUB BLD-MCNC: NEGATIVE MG/DL
CLARITY, POC: CLEAR
COLOR UR: YELLOW
GLUCOSE UR STRIP-MCNC: NEGATIVE MG/DL
KETONES UR QL: NEGATIVE
LEUKOCYTE EST, POC: NEGATIVE
NITRITE UR-MCNC: NEGATIVE MG/ML
PH UR: 6 [PH] (ref 5–8)
PROT UR STRIP-MCNC: NEGATIVE MG/DL
RBC # UR STRIP: NEGATIVE /UL
SP GR UR: 1.02 (ref 1–1.03)
UROBILINOGEN UR QL: NORMAL

## 2018-10-03 PROCEDURE — 99396 PREV VISIT EST AGE 40-64: CPT | Performed by: OBSTETRICS & GYNECOLOGY

## 2018-10-03 PROCEDURE — 81002 URINALYSIS NONAUTO W/O SCOPE: CPT | Performed by: OBSTETRICS & GYNECOLOGY

## 2018-10-03 NOTE — PROGRESS NOTES
Routine Annual Visit    10/3/2018    Patient: Kymberly Edwards          MR#:[id number]    Chief Complaint   Patient presents with   • Gynecologic Exam     AE.  , neg pap, neg HR-HPV (no hx of abnormal).  Next pap 2019.  Contraception = TUBAL. New family hx - maternal uncle dx with prostate cancer @ age 60.  Cycles are 26 to 28 days apart, lasting 3 to 4 days.  Kymberly needs to have mammogram.  Last mg  (family hx of breast cancer)        48 y.o. female  who presents for annual exam.     HISTORY OF PRESENT ILLNESS:     Here for annual exam.  Not having any problems.  She has a maternal uncle who was diagnosed with prostate cancer at age 60. She also has a maternal aunt who had breast cancer under the age of 50, sometime in her 40s.  We discussed the need for either the maternal aunt or her mother to have BRCA1/2 gene mutation testing.  They're both alive.  She will contact them with this request.    GYN Complaints:None.       However during questioning she did mention some right breast tenderness in the upper outer quadrant.  This is been going on for about a month to month and a half.  She has been drinking more sodas recently with an increase in caffeine.  She has not been able to feel a specific mass during this time.  We will schedule a screening bilateral mammogram, and we will follow with a diagnostic right breast ultrasound if this tenderness continues.    Other Complaints: None.    GYN HISTORY:  1.  Menstrual Hx:  LMP 2018, regular every 26 to 27 days, lasting 3 to 4 days. No problems.                Current contraception: tubal ligation.    2.  History of abnormal Pap smear: no.        Pap smear Hx:  , Neg Pap, Neg HR-HPV.  Next Pap 2019.  Since we had a time period where she did not have any Pap smears, we will repeat a Pap today.  If the 2014 and this year's Pap are double negatives, then she can wait 5 years for the next Pap.     NEW PAST MEDICAL HISTORY:    3.  New Medical Hx:   "None.  Past Medical History:   Diagnosis Date   • Anxiety     Work related.   • Benign essential hypertension     RESOLVED. No longer on medication.   • Constipation     Intermittent.     • Gastroesophageal reflux disease 10/07/2016    OTC Prilosec.  Secondary to GERD after lap band surgery.   • Hemorrhoids     Intermittent.   • Irritable bowel syndrome     Not currently a problem. Resolved after Bariatric surgery.   • Kidney stone     Passed stone on her own.   • Regurgitation     GERD after lap band surgery.   • Vitamin D deficiency 2016           4.  New Surgical Hx:  None.  Past Surgical History:   Procedure Laterality Date   •  SECTION  2000    x2    \"ROGER\" ;      \"NATHANIEL\"  First one secondary to PIH, severe HTN, Labored, failed to progress. Next had a repeat C/S.   • CHOLECYSTECTOMY      Stones,Dr Danilo Cobos   • LAPAROSCOPIC GASTRIC BANDING      As of Oct, 2018, has lost 80# !!   • POSTPARTUM TUBAL LIGATION      At the time of her repeat .           5.  New Family Medical Hx:  Maternal Uncle dx with prostate cancer age 60.  Family History   Problem Relation Age of Onset   • Aortic aneurysm Mother         Small, no surgery.   • Heart failure Father    • Kidney failure Father    • No Known Problems Sister    • No Known Problems Brother    • Ulcerative colitis Maternal Grandmother          this year, bone cancer   • Heart attack Maternal Grandfather 49   • Diabetes Paternal Grandmother    • Heart attack Paternal Grandfather    • No Known Problems Sister    • No Known Problems Daughter    • No Known Problems Son    • Breast cancer Maternal Aunt 48   • Prostate cancer Maternal Uncle 60         6.  SCREENINGS:  =Family history of breast cancer: Maternal aunt, in her 40's.  I recommended that either her aunt or her mother have testing for the BRCA1/2 gene mutation.  Perform regular self breast exam: yes - Monthly    =Family history of ovarian cancer: " "no  =Family history of uterine cancer: no  =Family History of colon cancer: no      Mammogram: ordered. 2016  Colonoscopy: not indicated.  DEXA: not indicated.    7.  LIFESTYLE:  =Diet: Trying to eat healthy.     =Exercise:  yes - Walking.   =I recommended 30 minutes of aerobic exercise 5 times a week.     =Calcium  no.  =Vitamin D:  no.   = We discussed calcium intake needs to help prevent osteoporosis:      Recommended 600 mg of calcium daily and 1000 IUs of vitamin D 3 daily.      Review of Systems   Constitutional: Positive for fatigue.   Musculoskeletal: Positive for arthralgias.        JOINT STIFFNESS   Skin:        BREAST PAIN   All other systems reviewed and are negative.      Objective     /74   Ht 165.1 cm (65\")   Wt 93 kg (205 lb)   LMP 09/28/2018   Breastfeeding? No   BMI 34.11 kg/m²     PHYSICAL EXAM    Constitutional: Well-developed, well-nourished, obese  female, in no distress, alert and well oriented ×3.    Skin is warm, dry, without rash.       Neck appears normal, trachea in the midline.  Thyroid exam normal.          No cervical lymphadenopathy.    Lungs clear to auscultation.  Chest wall appears normal.    Heart with regular rhythm without murmur or gallop.    Breasts:         Right breast nontender, without dominant mass.  No nipple discharge.            No superficial skin changes.  No axillary adenopathy.        Left breast nontender, without dominant mass.  No nipple discharge.            No superficial skin changes.  No axillary adenopathy.      Abdomen is obese, soft and nontender.No palpable mass.           No hepatosplenomegaly.  Flanks are negative.          Bowel sounds normal.  No abdominal bruit.          No inguinal lymphadenopathy bilaterally.     Pelvic exam :  Vulva normal.           External genitalia normal.  BUS negative.             Introitus normal.  Vaginal mucosa normal.  Normal discharge.            Cervix normal, small external os, Pap with HPV.  No " cervical motion tenderness.          Uterus midplane, small size, normal shape, and position.          Adnexa are negative bilaterally.  No tenderness.  No palpable mass.          Rectovaginal exam negative .      Musc /Skel: Lower extremities without edema.     Neuro: Coordination is grossly normal.  Gait is normal.    Psych: Mood and affect is normal.  Behavior normal.  Thought content normal.            Judgment normal.       =All questions were answered.      Assessment/Plan   Kymberly was seen today for gynecologic exam.    Diagnoses and all orders for this visit:    Well woman exam with routine gynecological exam    Perimenopause  Comments:  Age only, not symptomatic.    Screening for HPV (human papillomavirus)    Screening for hematuria or proteinuria  -     POC Urinalysis Dipstick    Screening for malignant neoplasm of cervix  -     IgP, Aptima HPV    Visit for screening mammogram  Comments:  We will schedule at [facility].  Orders:  -     Mammo Screening Bilateral With CAD      COMMENTS: Normal GYN exam.  I have ordered a screening mammogram.  She will stop her sodas with caffeine and if she still continues with some right breast tenderness for another month after her mammogram we should obtain a diagnostic right breast ultrasound.     I strongly encouraged Kymberly to have her maternal aunt or at least her mother had genetic testing for the BRCA1/2 gene mutation.  If neither one will do this, then the patient should be tested.    Nelson Gilbert MD  10/3/2018

## 2018-10-09 LAB
CYTOLOGIST CVX/VAG CYTO: NORMAL
CYTOLOGY CVX/VAG DOC THIN PREP: NORMAL
DX ICD CODE: NORMAL
HIV 1 & 2 AB SER-IMP: NORMAL
HPV I/H RISK 4 DNA CVX QL PROBE+SIG AMP: NEGATIVE
Lab: NORMAL
OTHER STN SPEC: NORMAL
PATH REPORT.FINAL DX SPEC: NORMAL
STAT OF ADQ CVX/VAG CYTO-IMP: NORMAL

## 2018-10-23 ENCOUNTER — OFFICE VISIT (OUTPATIENT)
Dept: BARIATRICS/WEIGHT MGMT | Facility: CLINIC | Age: 48
End: 2018-10-23

## 2018-10-23 VITALS
HEIGHT: 65 IN | DIASTOLIC BLOOD PRESSURE: 79 MMHG | TEMPERATURE: 98 F | WEIGHT: 202 LBS | BODY MASS INDEX: 33.66 KG/M2 | HEART RATE: 65 BPM | SYSTOLIC BLOOD PRESSURE: 116 MMHG | RESPIRATION RATE: 18 BRPM

## 2018-10-23 DIAGNOSIS — R53.82 CHRONIC FATIGUE: ICD-10-CM

## 2018-10-23 DIAGNOSIS — R13.19 OTHER DYSPHAGIA: Primary | ICD-10-CM

## 2018-10-23 DIAGNOSIS — Z71.3 DIETARY COUNSELING: ICD-10-CM

## 2018-10-23 DIAGNOSIS — K21.9 GASTROESOPHAGEAL REFLUX DISEASE, ESOPHAGITIS PRESENCE NOT SPECIFIED: ICD-10-CM

## 2018-10-23 DIAGNOSIS — Z71.82 EXERCISE COUNSELING: ICD-10-CM

## 2018-10-23 DIAGNOSIS — R11.2 NAUSEA AND VOMITING, INTRACTABILITY OF VOMITING NOT SPECIFIED, UNSPECIFIED VOMITING TYPE: ICD-10-CM

## 2018-10-23 DIAGNOSIS — E66.9 OBESITY, CLASS I, BMI 30-34.9: ICD-10-CM

## 2018-10-23 PROCEDURE — 99214 OFFICE O/P EST MOD 30 MIN: CPT | Performed by: SURGERY

## 2018-10-23 NOTE — PROGRESS NOTES
[facility] BAR[facility]ATR[facility] BARIATRIC SURGERY  [address]  [facility] [address] [phone]  [address]  [facility] [address] [phone]  Dept: [phone]  10/23/2018      Kymberly Edwards.  [id number]  [id number]  1970  female      Chief Complaint   Patient presents with   • Follow-up     band follow up, pt having issues       BH Post-Op Bariatric Surgery:   Kymberly Edwards is status post Lapband procedure, performed on 6/30/14.     HPI:   Today's weight is 91.6 kg (202 lb)  pounds, today's BMI is Body mass index is 33.61 kg/m². and she has a loss of 8 pounds since the last visit. The patient reports decreased hunger and  loss of appetite.     Patient admits to nausea and dysphagia. The patient denies abdominal pain. The patientdoes have vomitng. The patientdoes have reflux.  Patient states she was doing well until 2 weeks ago started having trouble after eating solid food stuck.  She is only tolerating liquids.    Diet and Exercise: Diet history reviewed and discussed with the patient. Weight loss/gains to date discussed with the patient. She reports eating 3 meals per day, a typical portion size of 1 cup, eating 3 snack per day, drinking 5 8-oz. glasses of water per day. The patient cannot tolerate solid protein.   The patient is not eating protein first. The patient is limiting food volume. The patient is not taking vitamins. The patient is limiting snacking. The patient is exercising regularly. She is not drinking carbonated beverages.     The following portions of the patient's history were reviewed and updated as appropriate: allergies, current medications, past family history, past medical history, past social history, past surgical history and problem list.    Vitals:    10/23/18 1257   BP: 116/79   Pulse: 65   Resp: 18   Temp: 98 °F (36.7 °C)       Review of Systems   Constitutional: Positive for fatigue.   Gastrointestinal: Positive for nausea  and vomiting.   All other systems reviewed and are negative.      Physical Exam   Constitutional: She is oriented to person, place, and time. She appears well-nourished.   HENT:   Head: Normocephalic and atraumatic.   Mouth/Throat: Oropharynx is clear and moist.   Eyes: Pupils are equal, round, and reactive to light. Conjunctivae and EOM are normal. No scleral icterus.   Neck: Normal range of motion. Neck supple. No thyromegaly present.   Cardiovascular: Normal rate and regular rhythm.    Pulmonary/Chest: Effort normal and breath sounds normal.   Abdominal: Soft. Bowel sounds are normal. She exhibits no distension. There is no tenderness. There is no rebound and no guarding. No hernia.   Musculoskeletal: Normal range of motion.   Lymphadenopathy:     She has no cervical adenopathy.   Neurological: She is alert and oriented to person, place, and time. No cranial nerve deficit. Coordination normal.   Skin: Skin is warm and dry. No erythema.   Psychiatric: She has a normal mood and affect. Her behavior is normal.   Vitals reviewed.        Assessment: Post-operatively the patient is post LAP-BAND June 2014 with adjustment July of this year 3 months ago and did well.  She states 2 weeks ago she ate something that the leg got stuck and has had problems ever since.  She is tolerating liquids.  All of the fluid was removed as below.  I instructed patient to call the office if still having trouble after today.  We'll proceed with upper GI if have any trouble otherwise will see her back and next visit in 3-4 weeks.  Approximately 25 minutes was spent with the patient and over 20 minutes spent counseling on diet and nutrition.    Encounter Diagnoses   Name Primary?   • Other dysphagia Yes   • Obesity, Class I, BMI 30-34.9    • Gastroesophageal reflux disease, esophagitis presence not specified    • Dietary counseling    • Chronic fatigue    • Exercise counseling    • Nausea and vomiting, intractability of vomiting not  specified, unspecified vomiting type        Plan:    My impression is Kymberly Edwards has too much restriction of her band.  I think she would benefit from fluid removal from her band.  Under aseptic conditions, I accessed the port and removed 6cc to bring the total band volume to 0cc.  She was able to tolerate a glass of water at the conclusion of the fill.  She was advised to consume warm liquids for today and then soft solids for 24 hours before advancing back to a regular diet.   RTC for n/v/d/regurg.     Reviewed the importance of being able to tolerate dense/solid protein and vegetables for weight loss. Discussed eating foods that are easier to tolerate, softer foods, usually contribute to weight gain because they are higher calorie/carb and will not keep patient full for the recommended 3-4 hours.      She should follow-up in 3-4 weeks.      UGI reviewed with patient June 2016    Activity restrictions: None.   Instructions / Recommendations: dietary counseling recommended, recommended a daily protein intake of  grams, patient was advised that the lap band system works best when consuming solid foods, vitamin supplement(s) recommended, recommended exercising at least 150 minutes per week, behavior modifications recommended and instructed to call the office for concerns, questions, or problems.

## 2018-10-29 ENCOUNTER — PROCEDURE VISIT (OUTPATIENT)
Dept: OBSTETRICS AND GYNECOLOGY | Age: 48
End: 2018-10-29

## 2018-10-29 ENCOUNTER — APPOINTMENT (OUTPATIENT)
Dept: WOMENS IMAGING | Facility: HOSPITAL | Age: 48
End: 2018-10-29

## 2018-10-29 DIAGNOSIS — Z12.31 VISIT FOR SCREENING MAMMOGRAM: Primary | ICD-10-CM

## 2018-10-29 PROCEDURE — 77067 SCR MAMMO BI INCL CAD: CPT | Performed by: OBSTETRICS & GYNECOLOGY

## 2018-10-29 PROCEDURE — 77067 SCR MAMMO BI INCL CAD: CPT | Performed by: RADIOLOGY

## 2018-10-30 NOTE — PROGRESS NOTES
I left a message for Kymberly, I think maybe for the second time, that her Pap smear was negative and negative for high-risk HPV, but did show some endometrial cells.  However her period started September 28 and we saw her October 3, and I think this is okay this soon after her period.   However I did ask her to inquire with her mother and maternal aunt about the BRCA 1/2 gene mutation testing, and that if neither one of them chose to do the testing then Kymberly should be tested.  She is to give a call back.

## 2018-12-03 ENCOUNTER — OFFICE VISIT (OUTPATIENT)
Dept: BARIATRICS/WEIGHT MGMT | Facility: CLINIC | Age: 48
End: 2018-12-03

## 2018-12-03 VITALS
TEMPERATURE: 98.2 F | RESPIRATION RATE: 18 BRPM | DIASTOLIC BLOOD PRESSURE: 84 MMHG | BODY MASS INDEX: 36.32 KG/M2 | HEART RATE: 71 BPM | SYSTOLIC BLOOD PRESSURE: 133 MMHG | HEIGHT: 65 IN | WEIGHT: 218 LBS

## 2018-12-03 DIAGNOSIS — Z71.3 DIETARY COUNSELING: ICD-10-CM

## 2018-12-03 DIAGNOSIS — Z98.84 STATUS FOLLOWING GASTRIC BANDING SURGERY FOR WEIGHT LOSS: ICD-10-CM

## 2018-12-03 DIAGNOSIS — R63.2 POLYPHAGIA: ICD-10-CM

## 2018-12-03 DIAGNOSIS — E66.9 OBESITY, CLASS II, BMI 35-39.9: Primary | ICD-10-CM

## 2018-12-03 DIAGNOSIS — Z71.82 EXERCISE COUNSELING: ICD-10-CM

## 2018-12-03 PROBLEM — E66.812 OBESITY, CLASS II, BMI 35-39.9: Status: ACTIVE | Noted: 2017-03-01

## 2018-12-03 PROCEDURE — 99213 OFFICE O/P EST LOW 20 MIN: CPT | Performed by: NURSE PRACTITIONER

## 2018-12-03 NOTE — PROGRESS NOTES
[facility] BARIATRIC [facility] BARIATRIC SURGERY  [address]  [facility] [id number] [phone]  [address]  [facility] [address] [phone]  Dept: [phone]  12/3/2018      Kymberly Edwards.  [id number]  [id number]  1970  female      Chief Complaint   Patient presents with   • Follow-up     band follow up       BH Post-Op Bariatric Surgery:   Kymberly Edwards is status post Lapband procedure APS, performed on 6/30/14 with 0ml. Patient was previously having trouble swallowing and at her last visit 6ml were removed from her band    HPI:   Today's weight is 98.9 kg (218 lb)  pounds, today's BMI is Body mass index is 36.28 kg/m². and she has a gain of 16 pounds since the last visit. The patient reports a portion size larger than the small plate, increased hunger and denies a loss of appetite.     Kymberly Edwards denies nausea, dysphagia, or abdominal pain. The patientdoes not have vomitng. The patientdoes not have reflux.    Diet and Exercise: Diet history reviewed and discussed with the patient. Weight loss/gains to date discussed with the patient. She reports eating 3 meals per day, a typical portion size of greater than 1 cup, eating 1 snack per day, drinking 5-6 8-oz. glasses of water per day. The patient can tolerate solid protein.   The patient is eating protein first. The patient is limiting food volume. The patient is not taking vitamins. The patient is not limiting snacking. The patient is exercising regularly. She is not drinking carbonated beverages.     The following portions of the patient's history were reviewed and updated as appropriate: allergies, current medications, past family history, past medical history, past social history, past surgical history and problem list.    Vitals:    12/03/18 1359   BP: 133/84   Pulse: 71   Resp: 18   Temp: 98.2 °F (36.8 °C)       Review of Systems   Constitutional: Positive for appetite change. Negative for fatigue  and unexpected weight change.   HENT: Negative.    Eyes: Negative.    Respiratory: Negative.    Cardiovascular: Negative.  Negative for leg swelling.   Gastrointestinal: Negative for abdominal distention, abdominal pain, constipation, diarrhea, nausea and vomiting.   Genitourinary: Negative for difficulty urinating, frequency and urgency.   Musculoskeletal: Negative for back pain.   Skin: Negative.    Psychiatric/Behavioral: Negative.    All other systems reviewed and are negative.      Physical Exam   Constitutional: She appears well-developed and well-nourished.   Neck: No thyromegaly present.   Cardiovascular: Normal rate, regular rhythm and normal heart sounds.   Pulmonary/Chest: Effort normal and breath sounds normal. No respiratory distress. She has no wheezes.   Abdominal: Soft. Bowel sounds are normal. She exhibits no distension. There is no tenderness. There is no guarding. No hernia.   Musculoskeletal: She exhibits no edema or tenderness.   Neurological: She is alert.   Skin: Skin is warm and dry. No rash noted. No erythema.   Psychiatric: She has a normal mood and affect. Her behavior is normal.   Nursing note and vitals reviewed.      Assessment: Post-operatively the patient is doing well.     Encounter Diagnoses   Name Primary?   • Obesity, Class II, BMI 35-39.9 Yes   • Polyphagia    • Status following gastric banding surgery for weight loss    • Exercise counseling    • Dietary counseling        Plan:     I think she would benefit from additional band restriction.  Under aseptic conditions, I accessed the port and 2mls of fluid were added for a total of 2mls.  She was able to tolerate a glass of water at the conclusion of the fill.  She was advised to consume soft solids for 24 hours before advancing back to a regular diet.  RTC for n/v/d/regurg.     We discussed her protein sources and that eating dense solid protein along with plenty of vegetables and fresh fruits is important for weight loss.  Reviewed appropriate water intake- half of body weight in ounces and exercise routine- minimum of 150 minutes per with including both cardio and strength training. Instructed not to drink with meals and wait 45 minutes after each meal before drinking.    She should follow-up in 4-6 weeks       Activity restrictions: None.   Instructions / Recommendations: dietary counseling recommended, recommended a daily protein intake of  grams, patient was advised that the lap band system works best when consuming solid foods, vitamin supplement(s) recommended, recommended exercising at least 150 minutes per week, behavior modifications recommended and instructed to call the office for concerns, questions, or problems.

## 2019-01-07 ENCOUNTER — OFFICE VISIT (OUTPATIENT)
Dept: BARIATRICS/WEIGHT MGMT | Facility: CLINIC | Age: 49
End: 2019-01-07

## 2019-01-07 VITALS
WEIGHT: 226 LBS | TEMPERATURE: 98.1 F | BODY MASS INDEX: 37.65 KG/M2 | SYSTOLIC BLOOD PRESSURE: 129 MMHG | HEART RATE: 65 BPM | DIASTOLIC BLOOD PRESSURE: 82 MMHG | HEIGHT: 65 IN | RESPIRATION RATE: 18 BRPM

## 2019-01-07 DIAGNOSIS — Z71.82 EXERCISE COUNSELING: ICD-10-CM

## 2019-01-07 DIAGNOSIS — Z71.3 DIETARY COUNSELING: ICD-10-CM

## 2019-01-07 DIAGNOSIS — Z98.84 STATUS FOLLOWING GASTRIC BANDING SURGERY FOR WEIGHT LOSS: ICD-10-CM

## 2019-01-07 DIAGNOSIS — R63.2 POLYPHAGIA: ICD-10-CM

## 2019-01-07 DIAGNOSIS — M25.50 MULTIPLE JOINT PAIN: ICD-10-CM

## 2019-01-07 DIAGNOSIS — E66.9 OBESITY, CLASS II, BMI 35-39.9: Primary | ICD-10-CM

## 2019-01-07 DIAGNOSIS — E55.9 VITAMIN D DEFICIENCY: ICD-10-CM

## 2019-01-07 PROCEDURE — 99213 OFFICE O/P EST LOW 20 MIN: CPT | Performed by: NURSE PRACTITIONER

## 2019-01-07 NOTE — PROGRESS NOTES
[facility] BARIATRIC [facility] BARIATRIC SURGERY  [address]  [facility] [address] [phone]  [address]  [facility] [id number] [phone]  Dept: [phone]  1/7/2019      Kymberly Edwards.  [id number]  [id number]  1970  female      Chief Complaint   Patient presents with   • Follow-up     band follow up       BH Post-Op Bariatric Surgery:   Kymberly Edwards is status post Lapband procedure APS, performed on 6/30/14.     HPI:   Today's weight is 103 kg (226 lb)  pounds, today's BMI is Body mass index is 37.61 kg/m². and she has a gain of 8 pounds since the last visit. The patient reports a portion size larger than the small plate, increased hunger and denies a loss of appetite.     Kymberly Edwards denies nausea, dysphagia, or abdominal pain. The patientdoes not have vomitng. The patientdoes not have reflux.    Diet and Exercise: Diet history reviewed and discussed with the patient. Weight loss/gains to date discussed with the patient. She reports eating 4-5 meals per day, a typical portion size of greater than 1 cup, eating 1 snack per day, drinking 6 8-oz. glasses of water per day. The patient can tolerate solid protein.   The patient is eating protein first. The patient is limiting food volume. The patient is taking vitamins. The patient is not limiting snacking. The patient is exercising regularly. She is not drinking carbonated beverages.     The following portions of the patient's history were reviewed and updated as appropriate: allergies, current medications, past family history, past medical history, past social history, past surgical history and problem list.    Vitals:    01/07/19 1323   BP: 129/82   Pulse: 65   Resp: 18   Temp: 98.1 °F (36.7 °C)       Review of Systems   Constitutional: Positive for appetite change. Negative for fatigue and unexpected weight change.   HENT: Negative.    Eyes: Negative.    Respiratory: Negative.    Cardiovascular:  Negative.  Negative for leg swelling.   Gastrointestinal: Negative for abdominal distention, abdominal pain, constipation, diarrhea, nausea and vomiting.   Genitourinary: Negative for difficulty urinating, frequency and urgency.   Musculoskeletal: Negative for back pain.   Skin: Negative.    Psychiatric/Behavioral: Negative.    All other systems reviewed and are negative.      Physical Exam   Constitutional: She appears well-developed and well-nourished.   Neck: No thyromegaly present.   Cardiovascular: Normal rate, regular rhythm and normal heart sounds.   Pulmonary/Chest: Effort normal and breath sounds normal. No respiratory distress. She has no wheezes.   Abdominal: Soft. Bowel sounds are normal. She exhibits no distension. There is no tenderness. There is no guarding. No hernia.   Musculoskeletal: She exhibits no edema or tenderness.   Neurological: She is alert.   Skin: Skin is warm and dry. No rash noted. No erythema.   Psychiatric: She has a normal mood and affect. Her behavior is normal.   Nursing note and vitals reviewed.      Assessment: Post-operatively the patient is doing well    Encounter Diagnoses   Name Primary?   • Obesity, Class II, BMI 35-39.9 Yes   • Exercise counseling    • Dietary counseling    • Vitamin D deficiency    • Multiple joint pain    • Status following gastric banding surgery for weight loss        Plan:     I think she would benefit from additional band restriction.  Under aseptic conditions, I accessed the port and 2mls of fluid were added for a total of 4mls.  She was able to tolerate a glass of water at the conclusion of the fill.  She was advised to consume soft solids for 24 hours before advancing back to a regular diet.  RTC for n/v/d/regurg.     We discussed her protein sources and that eating dense solid protein along with plenty of vegetables and fresh fruits is important for weight loss. Reviewed appropriate water intake- half of body weight in ounces and exercise  routine- minimum of 150 minutes per with including both cardio and strength training. Instructed not to drink with meals and wait 45 minutes after each meal before drinking.    She should follow-up in 4-6 weeks       Activity restrictions: None.   Instructions / Recommendations: dietary counseling recommended, recommended a daily protein intake of  grams, patient was advised that the lap band system works best when consuming solid foods, vitamin supplement(s) recommended, recommended exercising at least 150 minutes per week, behavior modifications recommended and instructed to call the office for concerns, questions, or problems.

## 2019-01-10 ENCOUNTER — OFFICE VISIT (OUTPATIENT)
Dept: BARIATRICS/WEIGHT MGMT | Facility: CLINIC | Age: 49
End: 2019-01-10

## 2019-01-10 VITALS
BODY MASS INDEX: 36.15 KG/M2 | RESPIRATION RATE: 18 BRPM | SYSTOLIC BLOOD PRESSURE: 120 MMHG | TEMPERATURE: 98.1 F | HEART RATE: 75 BPM | WEIGHT: 217 LBS | DIASTOLIC BLOOD PRESSURE: 80 MMHG | HEIGHT: 65 IN

## 2019-01-10 DIAGNOSIS — E66.9 OBESITY, CLASS II, BMI 35-39.9: ICD-10-CM

## 2019-01-10 DIAGNOSIS — R13.19 OTHER DYSPHAGIA: Primary | ICD-10-CM

## 2019-01-10 DIAGNOSIS — R11.2 NAUSEA AND VOMITING, INTRACTABILITY OF VOMITING NOT SPECIFIED, UNSPECIFIED VOMITING TYPE: ICD-10-CM

## 2019-01-10 DIAGNOSIS — Z71.3 DIETARY COUNSELING: ICD-10-CM

## 2019-01-10 PROCEDURE — 99213 OFFICE O/P EST LOW 20 MIN: CPT | Performed by: NURSE PRACTITIONER

## 2019-01-10 NOTE — PROGRESS NOTES
[facility] BARIATRIC [facility] BARIATRIC SURGERY  [address]  [facility] [address] [phone]  [address]  [facility] [address] [phone]  Dept: [phone]  1/10/2019      Kymberly Edwards.  [id number]  [id number]  1970  female      Chief Complaint   Patient presents with   • Follow-up     BAND FOLLWO UP/TOO TIGHT       BH Post-Op Bariatric Surgery:   Kymberly Edwards is status post Lapband procedure APS, performed on 6/30/14.     HPI:   Today's weight is 98.4 kg (217 lb)  pounds, today's BMI is Body mass index is 36.11 kg/m². and she has a loss of 9 pounds since the last visit. The patient reports decreased hunger and  loss of appetite.     Patient admits to nausea and dysphagia. The patient denies abdominal pain. The patientdoes have vomitng. The patientdoes have reflux.    Diet and Exercise: Diet history reviewed and discussed with the patient. Weight loss/gains to date discussed with the patient. She reports eating 1-2 meals per day, a typical portion size of 1/2 cup, eating 1 snack per day, drinking 5 8-oz. glasses of water per day. The patient cannot tolerate solid protein.   The patient is eating protein first. The patient is limiting food volume. The patient is not taking vitamins. The patient is limiting snacking. The patient is not exercising regularly. She is not drinking carbonated beverages.     The following portions of the patient's history were reviewed and updated as appropriate: allergies, current medications, past family history, past medical history, past social history, past surgical history and problem list.    Vitals:    01/10/19 1545   BP: 120/80   Pulse: 75   Resp: 18   Temp: 98.1 °F (36.7 °C)       Review of Systems   Constitutional: Positive for appetite change. Negative for fatigue and unexpected weight change.   HENT: Positive for trouble swallowing.    Eyes: Negative.    Respiratory: Negative.    Cardiovascular: Negative.   Negative for leg swelling.   Gastrointestinal: Positive for vomiting. Negative for abdominal distention, abdominal pain, constipation, diarrhea and nausea.   Genitourinary: Negative for difficulty urinating, frequency and urgency.   Musculoskeletal: Negative for back pain.   Skin: Negative.    Psychiatric/Behavioral: Negative.    All other systems reviewed and are negative.      Physical Exam   Constitutional: She appears well-developed and well-nourished.   Neck: No thyromegaly present.   Cardiovascular: Normal rate, regular rhythm and normal heart sounds.   Pulmonary/Chest: Effort normal and breath sounds normal. No respiratory distress. She has no wheezes.   Abdominal: Soft. Bowel sounds are normal. She exhibits no distension. There is no tenderness. There is no guarding. No hernia.   Musculoskeletal: She exhibits no edema or tenderness.   Neurological: She is alert.   Skin: Skin is warm and dry. No rash noted. No erythema.   Psychiatric: She has a normal mood and affect. Her behavior is normal.   Nursing note and vitals reviewed.      Assessment: Post-operatively the patient is struggling with symptoms of being overly restricted    Encounter Diagnoses   Name Primary?   • Other dysphagia Yes   • Nausea and vomiting, intractability of vomiting not specified, unspecified vomiting type    • Dietary counseling    • Obesity, Class II, BMI 35-39.9        Plan:    My impression is Kymberly Edwards has too much restriction of her band.  I think she would benefit from fluid removal from her band.  Under aseptic conditions, I accessed the port and removed 1cc to bring the total band volume to 3cc.  She was able to tolerate a glass of water at the conclusion of the fill.  She was advised to consume warm liquids for today and then soft solids for 24 hours before advancing back to a regular diet.   RTC for n/v/d/regurg.     Reviewed the importance of being able to tolerate dense/solid protein and vegetables for weight loss.  Discussed eating foods that are easier to tolerate, softer foods, usually contribute to weight gain because they are higher calorie/carb and will not keep patient full for the recommended 3-4 hours.      She should follow-up in 4-6 weeks.      Activity restrictions: None.   Instructions / Recommendations: dietary counseling recommended, recommended a daily protein intake of  grams, patient was advised that the lap band system works best when consuming solid foods, vitamin supplement(s) recommended, recommended exercising at least 150 minutes per week, behavior modifications recommended and instructed to call the office for concerns, questions, or problems.

## 2019-03-01 ENCOUNTER — OFFICE VISIT (OUTPATIENT)
Dept: BARIATRICS/WEIGHT MGMT | Facility: CLINIC | Age: 49
End: 2019-03-01

## 2019-03-01 VITALS
SYSTOLIC BLOOD PRESSURE: 136 MMHG | BODY MASS INDEX: 38.25 KG/M2 | RESPIRATION RATE: 16 BRPM | DIASTOLIC BLOOD PRESSURE: 86 MMHG | HEART RATE: 60 BPM | TEMPERATURE: 97.2 F | WEIGHT: 229.6 LBS | HEIGHT: 65 IN

## 2019-03-01 DIAGNOSIS — E66.9 OBESITY, CLASS II, BMI 35-39.9: Primary | ICD-10-CM

## 2019-03-01 DIAGNOSIS — Z71.82 EXERCISE COUNSELING: ICD-10-CM

## 2019-03-01 DIAGNOSIS — Z71.3 DIETARY COUNSELING: ICD-10-CM

## 2019-03-01 DIAGNOSIS — R63.2 POLYPHAGIA: ICD-10-CM

## 2019-03-01 PROBLEM — R13.19 OTHER DYSPHAGIA: Status: RESOLVED | Noted: 2018-10-23 | Resolved: 2019-03-01

## 2019-03-01 PROBLEM — R11.2 N&V (NAUSEA AND VOMITING): Status: RESOLVED | Noted: 2017-06-07 | Resolved: 2019-03-01

## 2019-03-01 PROCEDURE — 99213 OFFICE O/P EST LOW 20 MIN: CPT | Performed by: NURSE PRACTITIONER

## 2019-03-01 NOTE — PROGRESS NOTES
[facility] BARIATRIC [facility] BARIATRIC SURGERY  [address]  [facility] [address] [phone]  [address]  [facility] [address] [phone]  Dept: [phone]  3/1/2019      Kymberly Edwards.  [id number]  [id number]  1970  female      Chief Complaint   Patient presents with   • Follow-up     Band follow up (3cc)       BH Post-Op Bariatric Surgery:   Kymberly Edwards is status post Lapband procedure APS, performed on 6/30/14.     HPI:   Today's weight is 104 kg (229 lb 9.6 oz)  pounds, today's BMI is Body mass index is 38.21 kg/m². and she has a gain of 12 pounds since the last visit. The patient reports a portion size larger than the small plate, increased hunger and denies a loss of appetite.     Kymberly Edwards denies nausea, dysphagia, or abdominal pain. The patientdoes not have vomitng. The patientdoes not have reflux.    Diet and Exercise: Diet history reviewed and discussed with the patient. Weight loss/gains to date discussed with the patient. She reports eating 4 meals per day, a typical portion size of greater than 1 cup, eating 1 snack per day, drinking 5+ 8-oz. glasses of water per day. The patient can tolerate solid protein.   The patient is eating protein first. The patient is limiting food volume. The patient is not taking vitamins. The patient is limiting snacking. The patient is not exercising regularly. She is drinking carbonated beverages.     The following portions of the patient's history were reviewed and updated as appropriate: allergies, current medications, past family history, past medical history, past social history, past surgical history and problem list.    Vitals:    03/01/19 1329   BP: 136/86   Pulse: 60   Resp: 16   Temp: 97.2 °F (36.2 °C)       Review of Systems   Constitutional: Positive for unexpected weight change.   Endocrine: Positive for polyphagia.   All other systems reviewed and are negative.      Physical Exam    Constitutional: She is oriented to person, place, and time. She appears well-developed and well-nourished.   HENT:   Head: Normocephalic and atraumatic.   Eyes: EOM are normal.   Cardiovascular: Normal rate.   Pulmonary/Chest: Effort normal.   Abdominal: Soft.   Musculoskeletal: Normal range of motion.   Neurological: She is alert and oriented to person, place, and time.   Skin: Skin is warm and dry.   Psychiatric: She has a normal mood and affect. Her behavior is normal. Judgment and thought content normal.   Vitals reviewed.      Assessment: Post-operatively the patient would benefit from additional restriction    Encounter Diagnoses   Name Primary?   • Obesity, Class II, BMI 35-39.9 Yes   • Dietary counseling    • Polyphagia    • Exercise counseling        Plan:     I think she would benefit from additional band restriction.  Under aseptic conditions, I accessed the port and 1mls of fluid were added for a total of 4mls.  She was able to tolerate a glass of water at the conclusion of the fill.  She was advised to consume soft solids for 24 hours before advancing back to a regular diet.  RTC for n/v/d/regurg.     We discussed her protein sources and that eating dense solid protein along with plenty of vegetables and fresh fruits is important for weight loss. Reviewed appropriate water intake- half of body weight in ounces and exercise routine- minimum of 150 minutes per with including both cardio and strength training. Instructed not to drink with meals and wait 45 minutes after each meal before drinking.    She should follow-up in 6-8 weeks       Activity restrictions: None.   Instructions / Recommendations: dietary counseling recommended, recommended a daily protein intake of  grams, patient was advised that the lap band system works best when consuming solid foods, vitamin supplement(s) recommended, recommended exercising at least 150 minutes per week, behavior modifications recommended and instructed to  call the office for concerns, questions, or problems.

## 2019-05-03 ENCOUNTER — OFFICE VISIT (OUTPATIENT)
Dept: BARIATRICS/WEIGHT MGMT | Facility: CLINIC | Age: 49
End: 2019-05-03

## 2019-05-03 VITALS
DIASTOLIC BLOOD PRESSURE: 77 MMHG | TEMPERATURE: 98.1 F | HEART RATE: 69 BPM | RESPIRATION RATE: 18 BRPM | BODY MASS INDEX: 37.49 KG/M2 | SYSTOLIC BLOOD PRESSURE: 118 MMHG | WEIGHT: 225 LBS | HEIGHT: 65 IN

## 2019-05-03 DIAGNOSIS — R11.10 REGURGITATION OF FOOD: ICD-10-CM

## 2019-05-03 DIAGNOSIS — E66.9 OBESITY, CLASS II, BMI 35-39.9: Primary | ICD-10-CM

## 2019-05-03 DIAGNOSIS — Z71.3 DIETARY COUNSELING: ICD-10-CM

## 2019-05-03 PROBLEM — R63.2 POLYPHAGIA: Status: RESOLVED | Noted: 2018-12-03 | Resolved: 2019-05-03

## 2019-05-03 PROCEDURE — 99213 OFFICE O/P EST LOW 20 MIN: CPT | Performed by: NURSE PRACTITIONER

## 2019-05-03 NOTE — PROGRESS NOTES
[facility] BARIATRIC SURGERY  [address]  [facility] [address] [phone]  [address]  [facility] [address] [phone]  Dept: [phone]  5/3/2019      Kymberly Edwards.  [id number]  [id number]  1970  female      Chief Complaint   Patient presents with   • Follow-up     Band (4mls)       BH Post-Op Bariatric Surgery:   Kymberly Edwards is status post Lapband procedure APS, performed on 6/30/4.     HPI:   Today's weight is 102 kg (225 lb)  pounds, today's BMI is Body mass index is 37.44 kg/m². and she has a loss of 5 pounds since the last visit. The patient reports decreased hunger and  loss of appetite.     Patient admits to nausea and dysphagia. The patient denies abdominal pain. The patientdoes have vomitng. The patientdoes not have reflux.    Diet and Exercise: Diet history reviewed and discussed with the patient. Weight loss/gains to date discussed with the patient. She reports eating 3-4 meals per day, a typical portion size of <1 cup, eating 1 snack per day, drinking 5+ 8-oz. glasses of water per day. The patient cannot tolerate solid protein.   The patient is not eating protein first. The patient is limiting food volume. The patient is not taking vitamins. The patient is limiting snacking. The patient is exercising regularly- walking a couple times a week. She is not drinking carbonated beverages.     The following portions of the patient's history were reviewed and updated as appropriate: allergies, current medications, past family history, past medical history, past social history, past surgical history and problem list.    Vitals:    05/03/19 1307   BP: 118/77   Pulse: 69   Resp: 18   Temp: 98.1 °F (36.7 °C)       Review of Systems   Gastrointestinal: Positive for vomiting.   All other systems reviewed and are negative.      Physical Exam   Constitutional: She is oriented to person, place, and time. She appears well-developed and  well-nourished.   HENT:   Head: Normocephalic and atraumatic.   Eyes: EOM are normal.   Cardiovascular: Normal rate.   Pulmonary/Chest: Effort normal.   Abdominal: Soft.   Musculoskeletal: Normal range of motion.   Neurological: She is alert and oriented to person, place, and time.   Skin: Skin is warm and dry.   Psychiatric: She has a normal mood and affect. Her behavior is normal. Judgment and thought content normal.   Vitals reviewed.      Assessment: Post-operatively the patient would benefit from having some fluid removed    Encounter Diagnoses   Name Primary?   • Obesity, Class II, BMI 35-39.9 Yes   • Dietary counseling    • Regurgitation of food        Plan:    My impression is Kymberly Edwards has too much restriction of her band.  I think she would benefit from fluid removal from her band.  Under aseptic conditions, I accessed the port and removed 0.05cc to bring the total band volume to 3.5cc.  She was able to tolerate a glass of water at the conclusion of the fill.  She was advised to consume warm liquids for today and then soft solids for 24 hours before advancing back to a regular diet.   RTC for n/v/d/regurg.     Reviewed the importance of being able to tolerate dense/solid protein and vegetables for weight loss. Discussed eating foods that are easier to tolerate, softer foods, usually contribute to weight gain because they are higher calorie/carb and will not keep patient full for the recommended 3-4 hours.      She should follow-up in 4-6 weeks.      UGI if symptoms persist    Activity restrictions: None.   Instructions / Recommendations: dietary counseling recommended, recommended a daily protein intake of  grams, patient was advised that the lap band system works best when consuming solid foods, vitamin supplement(s) recommended, recommended exercising at least 150 minutes per week, behavior modifications recommended and instructed to call the office for concerns, questions, or problems.

## 2020-01-21 RX ORDER — OMEPRAZOLE 20 MG/1
CAPSULE, DELAYED RELEASE ORAL
Qty: 90 CAPSULE | Refills: 1 | Status: SHIPPED | OUTPATIENT
Start: 2020-01-21 | End: 2020-04-17 | Stop reason: SDUPTHER

## 2020-01-21 RX ORDER — LEVOTHYROXINE SODIUM 0.05 MG/1
TABLET ORAL
Qty: 90 TABLET | Refills: 1 | Status: SHIPPED | OUTPATIENT
Start: 2020-01-21 | End: 2020-04-17 | Stop reason: SDUPTHER

## 2020-01-27 RX ORDER — ESCITALOPRAM OXALATE 10 MG/1
TABLET ORAL
Qty: 90 TABLET | Refills: 1 | Status: SHIPPED | OUTPATIENT
Start: 2020-01-27 | End: 2020-04-17 | Stop reason: SDUPTHER

## 2020-04-17 ENCOUNTER — TELEMEDICINE (OUTPATIENT)
Dept: FAMILY MEDICINE CLINIC | Facility: CLINIC | Age: 50
End: 2020-04-17

## 2020-04-17 VITALS — BODY MASS INDEX: 37.77 KG/M2 | WEIGHT: 235 LBS | HEIGHT: 66 IN

## 2020-04-17 DIAGNOSIS — K21.9 GASTROESOPHAGEAL REFLUX DISEASE WITHOUT ESOPHAGITIS: ICD-10-CM

## 2020-04-17 DIAGNOSIS — E55.9 VITAMIN D DEFICIENCY: ICD-10-CM

## 2020-04-17 DIAGNOSIS — T78.40XS ALLERGIC STATE, SEQUELA: ICD-10-CM

## 2020-04-17 DIAGNOSIS — R53.82 CHRONIC FATIGUE: ICD-10-CM

## 2020-04-17 DIAGNOSIS — E03.9 HYPOTHYROIDISM, UNSPECIFIED TYPE: Primary | ICD-10-CM

## 2020-04-17 DIAGNOSIS — F41.9 ANXIETY: ICD-10-CM

## 2020-04-17 PROBLEM — T78.40XA ALLERGIES: Status: ACTIVE | Noted: 2020-04-17

## 2020-04-17 PROBLEM — R11.10 REGURGITATION OF FOOD: Status: RESOLVED | Noted: 2019-05-03 | Resolved: 2020-04-17

## 2020-04-17 PROCEDURE — 99214 OFFICE O/P EST MOD 30 MIN: CPT | Performed by: FAMILY MEDICINE

## 2020-04-17 RX ORDER — LORATADINE 10 MG/1
CAPSULE, LIQUID FILLED ORAL AS NEEDED
COMMUNITY
End: 2021-01-19

## 2020-04-17 RX ORDER — ESCITALOPRAM OXALATE 10 MG/1
10 TABLET ORAL DAILY
Qty: 90 TABLET | Refills: 1 | Status: SHIPPED | OUTPATIENT
Start: 2020-04-17 | End: 2020-10-19 | Stop reason: SDUPTHER

## 2020-04-17 RX ORDER — OMEPRAZOLE 20 MG/1
20 CAPSULE, DELAYED RELEASE ORAL DAILY
Qty: 90 CAPSULE | Refills: 1 | Status: SHIPPED | OUTPATIENT
Start: 2020-04-17 | End: 2021-08-20 | Stop reason: SDUPTHER

## 2020-04-17 RX ORDER — LEVOTHYROXINE SODIUM 0.05 MG/1
50 TABLET ORAL DAILY
Qty: 90 TABLET | Refills: 1 | Status: SHIPPED | OUTPATIENT
Start: 2020-04-17 | End: 2020-11-22

## 2020-04-17 NOTE — PATIENT INSTRUCTIONS
Signed record release  Recommend low fat/low calorie diet and exercise greater than 150 minutes of cardio per week.   Continue current treatment plan.  Schedule lab appointment in about 4 weeks

## 2020-04-17 NOTE — PROGRESS NOTES
Subjective   Kymberly Edwards is a 49 y.o. female.     There were no vitals filed for this visit.     Chief Complaint   Patient presents with   • Establish Care     patient is transfering to [facility].   • Anxiety     patient needs refill on lexapro.    • Hypothyroidism     patient needs refill on synthroid.    • Heartburn     patient needs refill on omeprazole 20mg         History of Present Illness    6-month appointment was converted to a video visit in accordance with CDC recommendations and guidelines given the current coronavirus pandemic    6-month follow-up on anxiety, hypothyroidism, GERD, and fatigue    Last office visit with me at [facility] October 2019 for medication refills.  Unfortunately, patient's chart is not available for my review as a record release has not been signed yet.  However, per patient report last office visit refills only were done.  She has not had labs in approximately 1 year    Patient's anxiety has been well controlled with Lexapro 10 mg daily.  She tolerates this medication without side effects.  All considering the current pandemic she has been doing fine.  She is working from home on a split schedule.  Needs refill today    Patient's hypothyroidism has been well controlled with Synthroid.  Her last TSH level was over 1 year ago.  She has had some weight gain and fatigue.  Needs a refill today and labs are due    Patient's GERD has been well controlled with omeprazole.  Needs refill today.  Again, weight gain noted.  Although no reports of worsening with her GERD.    Patient has had more fatigue.  Note, she is off her vitamin D.  She has had weight gain.  Fair diet at best and limited exercise.  Her last set of labs was over 1 year ago    The following portions of the patient's history were reviewed and updated as appropriate: allergies, current medications, past family history, past medical history, past social history, past surgical history and problem list.    Review of Systems    Constitutional: Positive for fatigue and unexpected weight gain.   Respiratory: Negative for shortness of breath.    Cardiovascular: Negative for chest pain.   Gastrointestinal: Positive for GERD. Negative for abdominal pain.       Objective   Physical Exam   Constitutional: She is oriented to person, place, and time. She appears well-developed and well-nourished. No distress.   HENT:   Head: Normocephalic and atraumatic.   Pulmonary/Chest: Effort normal.   Neurological: She is alert and oriented to person, place, and time.   Psychiatric: She has a normal mood and affect. Her behavior is normal. Judgment and thought content normal.   Nursing note and vitals reviewed.       LABS/STUDIES --per patient report last set of labs was over 1 year ago but within normal limits    Procedures     Assessment/Plan   Kymberly was seen today for establish care, anxiety, hypothyroidism and heartburn.    Diagnoses and all orders for this visit:    Hypothyroidism, unspecified type --?  Control, given weight gain and fatigue we will need to recheck a TSH in the near future.  An order was placed for in approximately 4 weeks and hopefully this pandemic has resolved.  At this point will refill Synthroid 50 mcg 1 p.o. every morning.  However, this may need to be readjusted based on TSH  -     Lipid Panel With LDL / HDL Ratio; Future  -     TSH; Future    Anxiety --stable, will refill Lexapro 10 mg 1 p.o. daily.    Gastroesophageal reflux disease without esophagitis --stable, refill omeprazole 20 mg 1 p.o. daily    Allergic state, sequela    Vitamin D deficiency -?  Control, given history of vitamin D deficiency and fatigue will need to recheck vitamin D level.  Recommendations to follow  -     Vitamin D 25 Hydroxy; Future    Chronic fatigue -try to encourage healthier diet and exercise.  And regular sleep habits.  Will check labs as listed below  -     Comprehensive Metabolic Panel; Future  -     Lipid Panel With LDL / HDL Ratio; Future  -      CBC & Differential; Future    Other orders  -     escitalopram (LEXAPRO) 10 MG tablet; Take 1 tablet by mouth Daily.  -     levothyroxine (SYNTHROID, LEVOTHROID) 50 MCG tablet; Take 1 tablet by mouth Daily.  -     omeprazole (priLOSEC) 20 MG capsule; Take 1 capsule by mouth Daily.      Video visit time 25 minutes           Return in about 6 months (around 10/17/2020) for Annual physical.     This was an audio and video enabled telemedicine encounter.

## 2020-04-20 ENCOUNTER — PRIOR AUTHORIZATION (OUTPATIENT)
Dept: FAMILY MEDICINE CLINIC | Facility: CLINIC | Age: 50
End: 2020-04-20

## 2020-05-17 ENCOUNTER — RESULTS ENCOUNTER (OUTPATIENT)
Dept: FAMILY MEDICINE CLINIC | Facility: CLINIC | Age: 50
End: 2020-05-17

## 2020-05-17 DIAGNOSIS — E03.9 HYPOTHYROIDISM, UNSPECIFIED TYPE: ICD-10-CM

## 2020-05-17 DIAGNOSIS — E55.9 VITAMIN D DEFICIENCY: ICD-10-CM

## 2020-05-17 DIAGNOSIS — R53.82 CHRONIC FATIGUE: ICD-10-CM

## 2020-09-19 LAB
25(OH)D3+25(OH)D2 SERPL-MCNC: 28 NG/ML (ref 30–100)
ALBUMIN SERPL-MCNC: 4.3 G/DL (ref 3.5–5.2)
ALBUMIN/GLOB SERPL: 2.2 G/DL
ALP SERPL-CCNC: 98 U/L (ref 39–117)
ALT SERPL-CCNC: 12 U/L (ref 1–33)
AST SERPL-CCNC: 14 U/L (ref 1–32)
BASOPHILS # BLD AUTO: 0.05 10*3/MM3 (ref 0–0.2)
BASOPHILS NFR BLD AUTO: 0.9 % (ref 0–1.5)
BILIRUB SERPL-MCNC: 0.3 MG/DL (ref 0–1.2)
BUN SERPL-MCNC: 16 MG/DL (ref 6–20)
BUN/CREAT SERPL: 18.8 (ref 7–25)
CALCIUM SERPL-MCNC: 9 MG/DL (ref 8.6–10.5)
CHLORIDE SERPL-SCNC: 105 MMOL/L (ref 98–107)
CHOLEST SERPL-MCNC: 168 MG/DL (ref 0–200)
CO2 SERPL-SCNC: 24.3 MMOL/L (ref 22–29)
CREAT SERPL-MCNC: 0.85 MG/DL (ref 0.57–1)
EOSINOPHIL # BLD AUTO: 0.07 10*3/MM3 (ref 0–0.4)
EOSINOPHIL NFR BLD AUTO: 1.3 % (ref 0.3–6.2)
ERYTHROCYTE [DISTWIDTH] IN BLOOD BY AUTOMATED COUNT: 14.8 % (ref 12.3–15.4)
GLOBULIN SER CALC-MCNC: 2 GM/DL
GLUCOSE SERPL-MCNC: 81 MG/DL (ref 65–99)
HCT VFR BLD AUTO: 33.7 % (ref 34–46.6)
HDLC SERPL-MCNC: 54 MG/DL (ref 40–60)
HGB BLD-MCNC: 10.9 G/DL (ref 12–15.9)
IMM GRANULOCYTES # BLD AUTO: 0.02 10*3/MM3 (ref 0–0.05)
IMM GRANULOCYTES NFR BLD AUTO: 0.4 % (ref 0–0.5)
LDLC SERPL CALC-MCNC: 102 MG/DL (ref 0–100)
LDLC/HDLC SERPL: 1.89 {RATIO}
LYMPHOCYTES # BLD AUTO: 1.74 10*3/MM3 (ref 0.7–3.1)
LYMPHOCYTES NFR BLD AUTO: 31.6 % (ref 19.6–45.3)
MCH RBC QN AUTO: 25.7 PG (ref 26.6–33)
MCHC RBC AUTO-ENTMCNC: 32.3 G/DL (ref 31.5–35.7)
MCV RBC AUTO: 79.5 FL (ref 79–97)
MONOCYTES # BLD AUTO: 0.39 10*3/MM3 (ref 0.1–0.9)
MONOCYTES NFR BLD AUTO: 7.1 % (ref 5–12)
NEUTROPHILS # BLD AUTO: 3.24 10*3/MM3 (ref 1.7–7)
NEUTROPHILS NFR BLD AUTO: 58.7 % (ref 42.7–76)
NRBC BLD AUTO-RTO: 0 /100 WBC (ref 0–0.2)
PLATELET # BLD AUTO: 324 10*3/MM3 (ref 140–450)
POTASSIUM SERPL-SCNC: 4.2 MMOL/L (ref 3.5–5.2)
PROT SERPL-MCNC: 6.3 G/DL (ref 6–8.5)
RBC # BLD AUTO: 4.24 10*6/MM3 (ref 3.77–5.28)
SODIUM SERPL-SCNC: 135 MMOL/L (ref 136–145)
TRIGL SERPL-MCNC: 59 MG/DL (ref 0–150)
TSH SERPL DL<=0.005 MIU/L-ACNC: 1.39 UIU/ML (ref 0.27–4.2)
VLDLC SERPL CALC-MCNC: 11.8 MG/DL
WBC # BLD AUTO: 5.51 10*3/MM3 (ref 3.4–10.8)

## 2020-10-19 ENCOUNTER — OFFICE VISIT (OUTPATIENT)
Dept: FAMILY MEDICINE CLINIC | Facility: CLINIC | Age: 50
End: 2020-10-19

## 2020-10-19 VITALS
HEIGHT: 66 IN | TEMPERATURE: 96.9 F | DIASTOLIC BLOOD PRESSURE: 68 MMHG | OXYGEN SATURATION: 97 % | SYSTOLIC BLOOD PRESSURE: 110 MMHG | WEIGHT: 240.2 LBS | HEART RATE: 60 BPM | BODY MASS INDEX: 38.6 KG/M2

## 2020-10-19 DIAGNOSIS — Z23 NEED FOR TDAP VACCINATION: ICD-10-CM

## 2020-10-19 DIAGNOSIS — R53.82 CHRONIC FATIGUE: ICD-10-CM

## 2020-10-19 DIAGNOSIS — Z23 NEED FOR INFLUENZA VACCINATION: ICD-10-CM

## 2020-10-19 DIAGNOSIS — Z00.00 WELLNESS EXAMINATION: Primary | ICD-10-CM

## 2020-10-19 DIAGNOSIS — Z12.11 ENCOUNTER FOR SCREENING COLONOSCOPY: ICD-10-CM

## 2020-10-19 DIAGNOSIS — E03.9 HYPOTHYROIDISM, UNSPECIFIED TYPE: ICD-10-CM

## 2020-10-19 DIAGNOSIS — Z86.2 HISTORY OF ANEMIA: ICD-10-CM

## 2020-10-19 DIAGNOSIS — F41.9 ANXIETY: ICD-10-CM

## 2020-10-19 PROCEDURE — 99396 PREV VISIT EST AGE 40-64: CPT | Performed by: FAMILY MEDICINE

## 2020-10-19 PROCEDURE — 99214 OFFICE O/P EST MOD 30 MIN: CPT | Performed by: FAMILY MEDICINE

## 2020-10-19 PROCEDURE — 90686 IIV4 VACC NO PRSV 0.5 ML IM: CPT | Performed by: FAMILY MEDICINE

## 2020-10-19 PROCEDURE — 90471 IMMUNIZATION ADMIN: CPT | Performed by: FAMILY MEDICINE

## 2020-10-19 PROCEDURE — 90715 TDAP VACCINE 7 YRS/> IM: CPT | Performed by: FAMILY MEDICINE

## 2020-10-19 PROCEDURE — 90472 IMMUNIZATION ADMIN EACH ADD: CPT | Performed by: FAMILY MEDICINE

## 2020-10-19 RX ORDER — HYDROXYZINE PAMOATE 25 MG/1
25 CAPSULE ORAL 3 TIMES DAILY PRN
Qty: 30 CAPSULE | Refills: 3 | Status: SHIPPED | OUTPATIENT
Start: 2020-10-19 | End: 2021-01-19

## 2020-10-19 RX ORDER — ESCITALOPRAM OXALATE 20 MG/1
20 TABLET ORAL DAILY
Qty: 90 TABLET | Refills: 1 | Status: SHIPPED | OUTPATIENT
Start: 2020-10-19 | End: 2021-04-14

## 2020-10-19 NOTE — PROGRESS NOTES
Subjective   Kymberly Edwards is a 50 y.o. female.     Vitals:    10/19/20 1236   BP: 110/68   Pulse: 60   Temp: 96.9 °F (36.1 °C)   SpO2: 97%        Chief Complaint   Patient presents with   • Annual Exam     YEARLY WELLNESS HAS GYN FOR FEMALE WELLNESS   • Hypothyroidism     PATIENT IS FASTING HAD LABS IN SEPT MASK AND GOGGLES WORN   • Anxiety        History of Present Illness    Here for yearly wellness exam and 6-month follow-up for hypothyroidism, anxiety, and recent labs    LOV with me in April via telemedicine to get established, medication refills, and complaints of fatigue and poor sleep.  A lab order was placed at that telemedicine visit in the spring; however, apparently patient never had labs done until 3 weeks ago.  Here to follow-up on labs today as well as annual wellness exam.    Overall, doing okay all considering the stresses of the pandemic.  However, does still complain of poor sleep, increased anxiety with stress, and fatigue.  She states she just has difficulty falling asleep, cannot turn off her mind.  She has been on Lexapro 10 mg daily times many years for anxiety with depression.  Previously this has been well controlled with Lexapro 10 mg.  Tolerates medication without side effects.  Does report a change in her menstrual flow in the last 6 months.  States it has become more unpredictable.  Denies hot flushes.  Weight gain noted despite trying to maintain a healthy diet.  Yet, very limited exercise.  Working full-time.    Hypothyroidism--- has been well controlled with levothyroxine 50 mcg every morning.  Compliant with dosing regimen.  Weight gain of about 5 to 10 pounds in the last 6 to 12 months.    Routine health maintenance/screening test:  No colorectal screen yet  Has GYN doc for Pap smear and mammogram, last Pap smear in 2018, yearly mammogram  Last tetanus greater than 10 years ago  Last influenza greater than 3 years ago  No shingles vaccine  No hep C antibody screen  Tries to maintain  a healthy well-balanced diet yet limited cardio exercise  Non-smoker, nondrinker  Family history negative for colon cancer, premature CAD        The following portions of the patient's history were reviewed and updated as appropriate: allergies, current medications, past family history, past medical history, past social history, past surgical history and problem list.    Review of Systems   Constitutional: Positive for fatigue. Negative for fever, unexpected weight gain and unexpected weight loss.   Respiratory: Negative for cough and shortness of breath.    Cardiovascular: Negative for chest pain.   Psychiatric/Behavioral: Positive for sleep disturbance and stress.       Objective   Physical Exam  Vitals signs and nursing note reviewed.   Constitutional:       Appearance: Normal appearance. She is well-developed. She is obese.   HENT:      Head: Normocephalic and atraumatic.      Nose: Nose normal.   Eyes:      Conjunctiva/sclera: Conjunctivae normal.      Pupils: Pupils are equal, round, and reactive to light.   Neck:      Musculoskeletal: Normal range of motion and neck supple.      Thyroid: No thyromegaly.   Cardiovascular:      Rate and Rhythm: Normal rate and regular rhythm.      Heart sounds: Normal heart sounds. No murmur.   Pulmonary:      Effort: Pulmonary effort is normal.      Breath sounds: Normal breath sounds.   Abdominal:      General: Abdomen is flat. Bowel sounds are normal. There is no distension.      Palpations: Abdomen is soft. There is no hepatomegaly, splenomegaly or mass.      Tenderness: There is no abdominal tenderness. There is no guarding or rebound.      Hernia: No hernia is present.   Musculoskeletal: Normal range of motion.      Right lower leg: No edema.      Left lower leg: No edema.   Lymphadenopathy:      Cervical: No cervical adenopathy.   Skin:     General: Skin is warm.      Comments: No dysplastic or abnormal lesions   Neurological:      General: No focal deficit present.       Mental Status: She is alert.   Psychiatric:         Mood and Affect: Mood normal.         Behavior: Behavior normal.         Thought Content: Thought content normal.         Judgment: Judgment normal.          LABS/STUDIES --- September 2020--- hemoglobin 10.9, MCV 79, normal TSH, , vitamin D 28, otherwise normal CMP    Procedures     Assessment/Plan   Diagnoses and all orders for this visit:    1. Wellness examination (Primary) --normal limits.  Has GYN doc.  All screening up-to-date except for needs hepatitis C antibody screen, colorectal screening with colonoscopy, Tdap, and influenza vaccine.  See below orders  [place], needs to continue to improve upon a healthy well-balanced diet and needs to increase her cardio exercise to greater than 150 minutes weekly.  -     Hepatitis C Antibody    2. Encounter for screening colonoscopy  -     Ambulatory Referral For Screening Colonoscopy    3. Anxiety --uncontrolled.  Will increase Lexapro from 10 mg to 20 mg daily.  Also, will try to improve sleep by adding Vistaril 25 mg 1 p.o. nightly.  And, strongly recommend to improve upon a regular cardio exercise routine that is greater than 150 minutes weekly.    4. Hypothyroidism, unspecified type stable.  No change in medication.  Continue/refill Synthroid 50 mcg 1 p.o. every morning, will refill upon request    5. Chronic fatigue new diagnosis.  Most likely secondary to poor sleep and/or anemia??    Will add Vistaril 25 mg 1 p.o. nightly for insomnia  Will work-up anemia diagnosis with iron studies listed below  -     Iron and TIBC  -     Ferritin    6. History of anemia --new diagnosis.  Most likely secondary to perimenopause/menstrual cycle.  Will check iron studies listed below.  Recommend follow-up with her GYN for upcoming Pap smear in near future.  Also, will obtain colonoscopy for colorectal screening.  -     Iron and TIBC  -     Ferritin    7. Need for Tdap vaccination  -     Tdap Vaccine Greater Than or Equal To  6yo IM    8. Need for influenza vaccination  -     FluLaval Quad >6 Months (0231-7400)    Other orders  -     Cancel: Ferritin  -     escitalopram (LEXAPRO) 20 MG tablet; Take 1 tablet by mouth Daily.  Dispense: 90 tablet; Refill: 1  -     hydrOXYzine pamoate (Vistaril) 25 MG capsule; Take 1 capsule by mouth 3 (Three) Times a Day As Needed for Itching.  Dispense: 30 capsule; Refill: 3                 Wore goggles and face mask during entire visit.    Return in about 3 months (around 1/19/2021) for Recheck.

## 2020-10-19 NOTE — PATIENT INSTRUCTIONS
Increase Lexapro to 20 mg daily  Add Vistaril 25 mg nightly for sleep  Recommend low fat/low calorie diet and exercise greater than 150 minutes of cardio per week.   If not better follow-up sooner than next regular appointment.

## 2020-10-20 LAB
FERRITIN SERPL-MCNC: 6 NG/ML (ref 15–150)
HCV AB S/CO SERPL IA: <0.1 S/CO RATIO (ref 0–0.9)
IRON SATN MFR SERPL: 5 % (ref 15–55)
IRON SERPL-MCNC: 24 UG/DL (ref 27–159)
TIBC SERPL-MCNC: 464 UG/DL (ref 250–450)
UIBC SERPL-MCNC: 440 UG/DL (ref 131–425)

## 2020-11-22 RX ORDER — ESCITALOPRAM OXALATE 10 MG/1
TABLET ORAL
Qty: 90 TABLET | Refills: 0 | Status: SHIPPED | OUTPATIENT
Start: 2020-11-22 | End: 2020-11-25

## 2020-11-22 RX ORDER — LEVOTHYROXINE SODIUM 0.05 MG/1
TABLET ORAL
Qty: 90 TABLET | Refills: 0 | Status: SHIPPED | OUTPATIENT
Start: 2020-11-22 | End: 2021-01-25

## 2020-11-25 ENCOUNTER — OFFICE VISIT (OUTPATIENT)
Dept: BARIATRICS/WEIGHT MGMT | Facility: CLINIC | Age: 50
End: 2020-11-25

## 2020-11-25 VITALS
BODY MASS INDEX: 38.25 KG/M2 | HEIGHT: 66 IN | SYSTOLIC BLOOD PRESSURE: 135 MMHG | RESPIRATION RATE: 18 BRPM | WEIGHT: 238 LBS | HEART RATE: 59 BPM | DIASTOLIC BLOOD PRESSURE: 83 MMHG | TEMPERATURE: 97.7 F

## 2020-11-25 DIAGNOSIS — K21.9 GASTROESOPHAGEAL REFLUX DISEASE WITHOUT ESOPHAGITIS: ICD-10-CM

## 2020-11-25 DIAGNOSIS — R11.10 REGURGITATION OF FOOD: ICD-10-CM

## 2020-11-25 DIAGNOSIS — E66.9 OBESITY, CLASS II, BMI 35-39.9: Primary | ICD-10-CM

## 2020-11-25 PROCEDURE — 99213 OFFICE O/P EST LOW 20 MIN: CPT | Performed by: NURSE PRACTITIONER

## 2020-11-25 NOTE — PROGRESS NOTES
[facility]ATR[facility] BARIATRIC SURGERY  [address] [id number] [phone]  [address] [id number] [phone]  Dept: [phone]  11/25/2020      Kymberly Edwards.  [id number]  [id number]  1970  female      Chief Complaint   Patient presents with   • Follow-up     Band Folow Up (3.5ml)       BH Post-Op Bariatric Surgery:   Kymberly Edwards is status post Lapband procedure APS, performed on 6/30/14.     HPI:   Today's weight is 108 kg (238 lb)  pounds, today's BMI is Body mass index is 38.99 kg/m². and has a gain of 13 pounds since the last visit. The patient reports decreased hunger and  loss of appetite.     Patient admits to nausea and dysphagia. The patient denies abdominal pain. The patientdoes have vomitng. The patientdoes have reflux.    Diet and Exercise: Diet history reviewed and discussed with the patient. Weight loss/gains to date discussed with the patient. She reports eating 3 meals per day, a typical portion size of 1 cup, eating 1 snack per day, drinking 5 8-oz. glasses of water per day. The patient cannot tolerate solid protein.   The patient is not eating protein first. The patient is limiting food volume. The patient is taking vitamins. The patient is limiting snacking. The patient is not exercising regularly. She is not drinking carbonated beverages.     The following portions of the patient's history were reviewed and updated as appropriate: allergies, current medications, past family history, past medical history, past social history, past surgical history and problem list.    Vitals:    11/25/20 1452   BP: 135/83   Pulse: 59   Resp: 18   Temp: 97.7 °F (36.5 °C)       Review of Systems   Constitutional: Positive for unexpected weight change.   Gastrointestinal: Positive for vomiting.   All other systems reviewed and are negative.      Physical Exam  Vitals signs reviewed.   Constitutional:       Appearance:  Normal appearance. She is well-developed. She is obese.   HENT:      Head: Normocephalic and atraumatic.   Cardiovascular:      Rate and Rhythm: Normal rate.   Pulmonary:      Effort: Pulmonary effort is normal.   Abdominal:      Palpations: Abdomen is soft.   Musculoskeletal: Normal range of motion.   Skin:     General: Skin is warm and dry.   Neurological:      Mental Status: She is alert and oriented to person, place, and time.   Psychiatric:         Behavior: Behavior normal.         Thought Content: Thought content normal.         Judgment: Judgment normal.         Assessment: Post-operatively the patient would benefit from having some fluid removed    Encounter Diagnoses   Name Primary?   • Obesity, Class II, BMI 35-39.9 Yes   • Regurgitation of food    • Gastroesophageal reflux disease without esophagitis        Plan:    My impression is Kymberly Edwards has too much restriction of her band.  I think she would benefit from fluid removal from her band.  Under aseptic conditions, I accessed the port and removed 0.5cc to bring the total band volume to 3cc.  She was able to tolerate a glass of water at the conclusion of the fill.  She was advised to consume warm liquids for today and then soft solids for 24 hours before advancing back to a regular diet.   RTC for n/v/d/regurg.     Reviewed the importance of being able to tolerate dense/solid protein and vegetables for weight loss. Discussed eating foods that are easier to tolerate, softer foods, usually contribute to weight gain because they are higher calorie/carb and will not keep patient full for the recommended 3-4 hours.      She should follow-up in 2 months      UGI ordered    Activity restrictions: None.   Instructions / Recommendations: dietary counseling recommended, recommended a daily protein intake of  grams, patient was advised that the lap band system works best when consuming solid foods, vitamin supplement(s) recommended, recommended exercising  at least 150 minutes per week, behavior modifications recommended and instructed to call the office for concerns, questions, or problems.

## 2020-11-30 ENCOUNTER — TELEPHONE (OUTPATIENT)
Dept: GASTROENTEROLOGY | Facility: CLINIC | Age: 50
End: 2020-11-30

## 2020-12-03 ENCOUNTER — TRANSCRIBE ORDERS (OUTPATIENT)
Dept: SLEEP MEDICINE | Facility: HOSPITAL | Age: 50
End: 2020-12-03

## 2020-12-03 DIAGNOSIS — Z01.818 OTHER SPECIFIED PRE-OPERATIVE EXAMINATION: Primary | ICD-10-CM

## 2020-12-08 ENCOUNTER — LAB (OUTPATIENT)
Dept: LAB | Facility: HOSPITAL | Age: 50
End: 2020-12-08

## 2020-12-08 DIAGNOSIS — Z01.818 OTHER SPECIFIED PRE-OPERATIVE EXAMINATION: ICD-10-CM

## 2020-12-08 PROCEDURE — U0004 COV-19 TEST NON-CDC HGH THRU: HCPCS

## 2020-12-08 PROCEDURE — C9803 HOPD COVID-19 SPEC COLLECT: HCPCS

## 2020-12-09 LAB — SARS-COV-2 RNA RESP QL NAA+PROBE: NOT DETECTED

## 2020-12-10 ENCOUNTER — HOSPITAL ENCOUNTER (OUTPATIENT)
Dept: GENERAL RADIOLOGY | Facility: HOSPITAL | Age: 50
Discharge: HOME | End: 2020-12-10
Admitting: NURSE PRACTITIONER

## 2020-12-10 DIAGNOSIS — E66.9 OBESITY, CLASS II, BMI 35-39.9: ICD-10-CM

## 2020-12-10 DIAGNOSIS — K21.9 GASTROESOPHAGEAL REFLUX DISEASE WITHOUT ESOPHAGITIS: ICD-10-CM

## 2020-12-10 DIAGNOSIS — R11.10 REGURGITATION OF FOOD: ICD-10-CM

## 2020-12-10 PROCEDURE — 74240 X-RAY XM UPR GI TRC 1CNTRST: CPT

## 2020-12-18 ENCOUNTER — TELEPHONE (OUTPATIENT)
Dept: BARIATRICS/WEIGHT MGMT | Facility: CLINIC | Age: 50
End: 2020-12-18

## 2020-12-18 NOTE — TELEPHONE ENCOUNTER
Spoke to patient and informed her of UGI results. Patient stated she is feeling better from the previous fluid removal but will follow up at her next appointment and possibly have more fluid removed. Patient was instructed to contact us if she feels that she needs to come in sooner. Patient agreed with the plan.      ----- Message from HANS Hudson sent at 12/16/2020  9:21 AM EST -----  Patient most likely needs some more fluid removed. If you will call her and see how she is feeling since her last appt when we removed fluid. Thanks!

## 2021-01-14 ENCOUNTER — OFFICE VISIT (OUTPATIENT)
Dept: BARIATRICS/WEIGHT MGMT | Facility: CLINIC | Age: 51
End: 2021-01-14

## 2021-01-14 VITALS
TEMPERATURE: 97.3 F | HEIGHT: 66 IN | RESPIRATION RATE: 18 BRPM | HEART RATE: 64 BPM | BODY MASS INDEX: 38.25 KG/M2 | SYSTOLIC BLOOD PRESSURE: 120 MMHG | DIASTOLIC BLOOD PRESSURE: 80 MMHG | WEIGHT: 238 LBS

## 2021-01-14 DIAGNOSIS — E66.9 OBESITY, CLASS II, BMI 35-39.9: Primary | ICD-10-CM

## 2021-01-14 DIAGNOSIS — R11.10 REGURGITATION OF FOOD: ICD-10-CM

## 2021-01-14 DIAGNOSIS — K21.9 GASTROESOPHAGEAL REFLUX DISEASE WITHOUT ESOPHAGITIS: ICD-10-CM

## 2021-01-14 DIAGNOSIS — E66.01 MORBIDLY OBESE (HCC): ICD-10-CM

## 2021-01-14 PROCEDURE — 99213 OFFICE O/P EST LOW 20 MIN: CPT | Performed by: NURSE PRACTITIONER

## 2021-01-14 NOTE — PROGRESS NOTES
[facility] BARIATRIC SURGERY  [address] [id number] [phone]  [address] [id number] [phone]  Dept: [phone]  1/14/2021      Kymberly Edwards.  [id number]  [id number]  1970  female      Chief Complaint   Patient presents with   • Follow-up     Band follow up (3cc)       BH Post-Op Bariatric Surgery:   Kymberly Edwards is status post Lapband procedure APS, performed on 6/30/14.     HPI:   Today's weight is 108 kg (238 lb)  pounds, today's BMI is Body mass index is 38.99 kg/m². and has a loss of 0 pounds since the last visit. The patient reports decreased hunger and  loss of appetite.     Patient admits to nausea and dysphagia. The patient denies abdominal pain. The patientdoes have vomitng. The patientdoes have reflux.    Diet and Exercise: Diet history reviewed and discussed with the patient. Weight loss/gains to date discussed with the patient. She reports eating 2-3 meals per day, a typical portion size of 1 cup, eating 1 snack per day, drinking 4-5 8-oz. glasses of water per day. The patient cannot tolerate solid protein.   The patient is not eating protein first. The patient is limiting food volume. The patient is taking vitamins. The patient is limiting snacking. The patient is not exercising regularly. She is not drinking carbonated beverages.     The following portions of the patient's history were reviewed and updated as appropriate: allergies, current medications, past family history, past medical history, past social history, past surgical history and problem list.    Vitals:    01/14/21 1124   BP: 120/80   Pulse: 64   Resp: 18   Temp: 97.3 °F (36.3 °C)       Review of Systems   Constitutional: Positive for fatigue.   Gastrointestinal: Positive for vomiting.   All other systems reviewed and are negative.      Physical Exam  Vitals signs reviewed.   Constitutional:       Appearance: Normal appearance.  She is well-developed. She is obese.   HENT:      Head: Normocephalic and atraumatic.   Cardiovascular:      Rate and Rhythm: Normal rate.   Pulmonary:      Effort: Pulmonary effort is normal.   Abdominal:      Palpations: Abdomen is soft.   Musculoskeletal: Normal range of motion.   Skin:     General: Skin is warm and dry.   Neurological:      Mental Status: She is alert and oriented to person, place, and time.   Psychiatric:         Behavior: Behavior normal.         Thought Content: Thought content normal.         Judgment: Judgment normal.         Assessment: Post-operatively the patient needs fluid removed from band. We discussed her UGI and her chronic trouble with dysphagia/regurgitation. Reviewed with patient possible band removal/revision to another procedure.     Encounter Diagnoses   Name Primary?   • Obesity, Class II, BMI 35-39.9 Yes   • Regurgitation of food    • Gastroesophageal reflux disease without esophagitis    • Morbidly obese (CMS/[facility])        Plan:    My impression is Kymberly Edwards has too much restriction of her band.  I think she would benefit from fluid removal from her band.  Under aseptic conditions, I accessed the port and removed 1cc to bring the total band volume to 2cc.  She was able to tolerate a glass of water at the conclusion of the fill.  She was advised to consume warm liquids for today and then soft solids for 24 hours before advancing back to a regular diet.   RTC for n/v/d/regurg.     Reviewed the importance of being able to tolerate dense/solid protein and vegetables for weight loss. Discussed eating foods that are easier to tolerate, softer foods, usually contribute to weight gain because they are higher calorie/carb and will not keep patient full for the recommended 3-4 hours.      She should follow-up in 2 months.      UGI reviewed    Activity restrictions: None.   Instructions / Recommendations: dietary counseling recommended, recommended a daily protein intake of   grams, patient was advised that the lap band system works best when consuming solid foods, vitamin supplement(s) recommended, recommended exercising at least 150 minutes per week, behavior modifications recommended and instructed to call the office for concerns, questions, or problems.

## 2021-01-18 ENCOUNTER — TELEPHONE (OUTPATIENT)
Dept: BARIATRICS/WEIGHT MGMT | Facility: CLINIC | Age: 51
End: 2021-01-18

## 2021-01-18 NOTE — TELEPHONE ENCOUNTER
Spoke to patient about revision process and band removal. Patient had no further questions at this time.

## 2021-01-18 NOTE — TELEPHONE ENCOUNTER
----- Message from HANS Hudson sent at 1/14/2021  1:21 PM EST -----  Patient possibly interested in revision. Told her you could discuss insurance coverage at your convenience. Thanks!

## 2021-01-19 ENCOUNTER — OFFICE VISIT (OUTPATIENT)
Dept: FAMILY MEDICINE CLINIC | Facility: CLINIC | Age: 51
End: 2021-01-19

## 2021-01-19 VITALS
SYSTOLIC BLOOD PRESSURE: 110 MMHG | HEIGHT: 66 IN | DIASTOLIC BLOOD PRESSURE: 70 MMHG | OXYGEN SATURATION: 98 % | HEART RATE: 68 BPM | BODY MASS INDEX: 38.25 KG/M2 | TEMPERATURE: 97.5 F | WEIGHT: 238 LBS

## 2021-01-19 DIAGNOSIS — E03.9 HYPOTHYROIDISM, UNSPECIFIED TYPE: ICD-10-CM

## 2021-01-19 DIAGNOSIS — F51.01 PRIMARY INSOMNIA: ICD-10-CM

## 2021-01-19 DIAGNOSIS — F41.9 ANXIETY: ICD-10-CM

## 2021-01-19 DIAGNOSIS — D50.0 IRON DEFICIENCY ANEMIA DUE TO CHRONIC BLOOD LOSS: Primary | ICD-10-CM

## 2021-01-19 PROCEDURE — 99214 OFFICE O/P EST MOD 30 MIN: CPT | Performed by: FAMILY MEDICINE

## 2021-01-19 RX ORDER — FERROUS SULFATE TAB EC 324 MG (65 MG FE EQUIVALENT) 324 (65 FE) MG
324 TABLET DELAYED RESPONSE ORAL
COMMUNITY
End: 2022-05-18

## 2021-01-19 RX ORDER — HYDROXYZINE PAMOATE 25 MG/1
25 CAPSULE ORAL 3 TIMES DAILY PRN
Qty: 30 CAPSULE | Refills: 3 | Status: SHIPPED | OUTPATIENT
Start: 2021-01-19 | End: 2022-02-25

## 2021-01-19 NOTE — PATIENT INSTRUCTIONS
Add Vistaril for sleep, may also use as needed every 8 hours for breakthrough anxiety  Recommend low fat/low calorie diet and exercise greater than 150 minutes of cardio per week.   Continue current treatment plan.

## 2021-01-19 NOTE — PROGRESS NOTES
Subjective   Kymberly Edwards is a 50 y.o. female.     Vitals:    01/19/21 1449   BP: 110/70   Pulse: 68   Temp: 97.5 °F (36.4 °C)   SpO2: 98%        Chief Complaint   Patient presents with   • Anemia     FOLLOW UP LAST OFFICE VISIT PATIENT IS FASTING   • Hypothyroidism     MASK AND GOGGLES WORN   • Anxiety   • Insomnia        History of Present Illness    3-month follow-up on new diagnosis of anemia, anxiety, insomnia, and hypothyroidism    LOV with me in October for wellness exam and uncontrolled anxiety, fatigue with insomnia, and new diagnosis of anemia  An anemia work-up was initiated with extensive labs (see below).  And, she was asked to schedule for screening colonoscopy and follow-up with her GYN doc for routine Pap smear.  She was also started on over-the-counter iron 325 mg 1 p.o. daily.  On a good note, she has been compliant with the iron and tolerating without side effects.  Does feel better.  However, she has not followed through with a screening colonoscopy or Pap smear.    Also, Lexapro was increased from 10 to 20 mg and hydroxyzine was ordered for breakthrough anxiety and insomnia.  She states the Lexapro is helping; however, she never did get the prescription for hydroxyzine?!?  Using melatonin for sleep, states this is somewhat effective.    Overall, doing better.  Yet, still dealing with lots of stresses and is quite sick and tired of this pandemic!  No complaints today except for still issues with insomnia.  Uncertain if she needs refills?      The following portions of the patient's history were reviewed and updated as appropriate: allergies, current medications, past family history, past medical history, past social history, past surgical history and problem list.    Review of Systems   Constitutional: Positive for fatigue. Negative for unexpected weight gain and unexpected weight loss.   Respiratory: Negative for cough and shortness of breath.    Cardiovascular: Negative for chest pain.    Psychiatric/Behavioral: Positive for sleep disturbance and stress.       Objective   Physical Exam  Vitals signs and nursing note reviewed.   Constitutional:       Appearance: Normal appearance. She is well-developed. She is obese.   HENT:      Head: Normocephalic and atraumatic.      Nose: Nose normal.   Eyes:      Conjunctiva/sclera: Conjunctivae normal.      Pupils: Pupils are equal, round, and reactive to light.   Neck:      Musculoskeletal: Normal range of motion and neck supple.      Thyroid: No thyromegaly.   Cardiovascular:      Rate and Rhythm: Normal rate and regular rhythm.      Heart sounds: Normal heart sounds. No murmur.   Pulmonary:      Effort: Pulmonary effort is normal.      Breath sounds: Normal breath sounds.   Abdominal:      General: Abdomen is flat. Bowel sounds are normal. There is no distension.      Palpations: Abdomen is soft. There is no hepatomegaly, splenomegaly or mass.      Tenderness: There is no abdominal tenderness. There is no guarding or rebound.      Hernia: No hernia is present.   Musculoskeletal: Normal range of motion.      Right lower leg: No edema.      Left lower leg: No edema.   Lymphadenopathy:      Cervical: No cervical adenopathy.   Skin:     General: Skin is warm.   Neurological:      General: No focal deficit present.      Mental Status: She is alert.   Psychiatric:         Mood and Affect: Mood normal.         Behavior: Behavior normal.         Thought Content: Thought content normal.         Judgment: Judgment normal.          LABS/STUDIES --October 2020--hemoglobin 10.9, MCV 79, TIBC 464, iron 24, ferritin 6                                                          , vitamin D 28, normal TSH, normal CMP    Procedures     Assessment/Plan   Diagnoses and all orders for this visit:    1. Iron deficiency anemia due to chronic blood loss (Primary) --new diagnosis, stable.  Above work-up consistent with iron deficiency anemia, most likely secondary to  menometrorrhagia.  Again, needs to follow-up with GYN doc and needs screening colonoscopy (orders have been placed, referral done, patient just waiting to schedule)  We will recheck CBC today  Continue ferrous sulfate 3 and 25 mg 1 p.o. daily  -     CBC & Differential    2. Anxiety --stable.  No change of medication  Continue Lexapro 20 mg 1 p.o. daily, will refill upon request  Continue to improve upon a healthy well-balanced diet and regular cardio exercise greater than 150 minutes weekly    3. Primary insomnia --uncontrolled.  Will add Vistaril 25 mg 1 p.o. nightly, may also take every 8 hours as needed breakthrough anxiety  May continue melatonin over-the-counter    4. Hypothyroidism, unspecified type --stable.  TSH up-to-date, normal.  No change in medication  Continue Synthroid 50 mcg 1 p.o. every morning, will refill upon request    Other orders  -     hydrOXYzine pamoate (Vistaril) 25 MG capsule; Take 1 capsule by mouth 3 (Three) Times a Day As Needed for Anxiety.  Dispense: 30 capsule; Refill: 3      If CBC within normal limits and doing well then may follow-up in 6 months, otherwise sooner           Wore goggles and face mask during entire visit.    Return in about 6 months (around 7/19/2021) for Recheck.     Answers for HPI/ROS submitted by the patient on 1/18/2021   What is the primary reason for your visit?: Other  Please describe your symptoms.: Follow up from last visit, , Low iron  Have you had these symptoms before?: No  How long have you been having these symptoms?: Greater than 2 weeks  Please list any medications you are currently taking for this condition.: Otc iron supplement  Please describe any probable cause for these symptoms. : Poor diet

## 2021-01-20 LAB
BASOPHILS # BLD AUTO: 0 X10E3/UL (ref 0–0.2)
BASOPHILS NFR BLD AUTO: 1 %
EOSINOPHIL # BLD AUTO: 0.1 X10E3/UL (ref 0–0.4)
EOSINOPHIL NFR BLD AUTO: 1 %
ERYTHROCYTE [DISTWIDTH] IN BLOOD BY AUTOMATED COUNT: 15.7 % (ref 11.7–15.4)
HCT VFR BLD AUTO: 39.5 % (ref 34–46.6)
HGB BLD-MCNC: 12.9 G/DL (ref 11.1–15.9)
IMM GRANULOCYTES # BLD AUTO: 0 X10E3/UL (ref 0–0.1)
IMM GRANULOCYTES NFR BLD AUTO: 0 %
LYMPHOCYTES # BLD AUTO: 1.8 X10E3/UL (ref 0.7–3.1)
LYMPHOCYTES NFR BLD AUTO: 33 %
MCH RBC QN AUTO: 27.9 PG (ref 26.6–33)
MCHC RBC AUTO-ENTMCNC: 32.7 G/DL (ref 31.5–35.7)
MCV RBC AUTO: 85 FL (ref 79–97)
MONOCYTES # BLD AUTO: 0.3 X10E3/UL (ref 0.1–0.9)
MONOCYTES NFR BLD AUTO: 6 %
NEUTROPHILS # BLD AUTO: 3.3 X10E3/UL (ref 1.4–7)
NEUTROPHILS NFR BLD AUTO: 59 %
PLATELET # BLD AUTO: 325 X10E3/UL (ref 150–450)
RBC # BLD AUTO: 4.63 X10E6/UL (ref 3.77–5.28)
WBC # BLD AUTO: 5.6 X10E3/UL (ref 3.4–10.8)

## 2021-01-25 RX ORDER — LEVOTHYROXINE SODIUM 0.05 MG/1
TABLET ORAL
Qty: 30 TABLET | Refills: 0 | Status: SHIPPED | OUTPATIENT
Start: 2021-01-25 | End: 2021-03-05

## 2021-03-05 RX ORDER — LEVOTHYROXINE SODIUM 0.05 MG/1
TABLET ORAL
Qty: 30 TABLET | Refills: 3 | Status: SHIPPED | OUTPATIENT
Start: 2021-03-05 | End: 2021-06-07

## 2021-03-30 ENCOUNTER — BULK ORDERING (OUTPATIENT)
Dept: CASE MANAGEMENT | Facility: OTHER | Age: 51
End: 2021-03-30

## 2021-03-30 DIAGNOSIS — Z23 IMMUNIZATION DUE: ICD-10-CM

## 2021-04-14 RX ORDER — ESCITALOPRAM OXALATE 20 MG/1
TABLET ORAL
Qty: 90 TABLET | Refills: 0 | Status: SHIPPED | OUTPATIENT
Start: 2021-04-14 | End: 2021-07-12

## 2021-05-14 ENCOUNTER — OFFICE VISIT (OUTPATIENT)
Dept: BARIATRICS/WEIGHT MGMT | Facility: CLINIC | Age: 51
End: 2021-05-14

## 2021-05-14 VITALS
SYSTOLIC BLOOD PRESSURE: 135 MMHG | HEART RATE: 61 BPM | BODY MASS INDEX: 41.14 KG/M2 | RESPIRATION RATE: 18 BRPM | WEIGHT: 256 LBS | HEIGHT: 66 IN | DIASTOLIC BLOOD PRESSURE: 85 MMHG | TEMPERATURE: 97.1 F

## 2021-05-14 DIAGNOSIS — K21.9 GASTROESOPHAGEAL REFLUX DISEASE WITHOUT ESOPHAGITIS: ICD-10-CM

## 2021-05-14 DIAGNOSIS — E55.9 VITAMIN D DEFICIENCY: ICD-10-CM

## 2021-05-14 DIAGNOSIS — D50.0 IRON DEFICIENCY ANEMIA DUE TO CHRONIC BLOOD LOSS: ICD-10-CM

## 2021-05-14 DIAGNOSIS — M25.50 MULTIPLE JOINT PAIN: ICD-10-CM

## 2021-05-14 DIAGNOSIS — E66.01 OBESITY, CLASS III, BMI 40-49.9 (MORBID OBESITY) (HCC): Primary | ICD-10-CM

## 2021-05-14 DIAGNOSIS — R53.82 CHRONIC FATIGUE: ICD-10-CM

## 2021-05-14 PROBLEM — E66.9 OBESITY, CLASS II, BMI 35-39.9: Status: RESOLVED | Noted: 2017-03-01 | Resolved: 2021-05-14

## 2021-05-14 PROBLEM — E66.812 OBESITY, CLASS II, BMI 35-39.9: Status: RESOLVED | Noted: 2017-03-01 | Resolved: 2021-05-14

## 2021-05-14 PROCEDURE — 99213 OFFICE O/P EST LOW 20 MIN: CPT | Performed by: NURSE PRACTITIONER

## 2021-05-14 NOTE — PROGRESS NOTES
[facility] BARIATRIC SURGERY  [address] [address]  [phone]  [address] [address]  [phone]  Dept: [phone]  5/14/2021      Kymberly Edwards.  [id number]  [id number]  1970  female      Chief Complaint   Patient presents with   • Follow-up     Band follow Up (2ml)       BH Post-Op Bariatric Surgery:   Kymberly Edwards is status post Lapband procedure APS, performed on 6/30/2014 (2ml).     HPI:   Today's weight is 116 kg (256 lb)  pounds, today's BMI is Body mass index is 41.94 kg/m². and@ has a gain of 18 pounds since the last visit. The patient reports a portion size larger than the small plate, increased hunger and denies a loss of appetite.     Kymberly Edwards denies nausea, dysphagia, or abdominal pain. The patientdoes not have vomitng. The patientdoes not have reflux.    Diet and Exercise: Diet history reviewed and discussed with the patient. Weight loss/gains to date discussed with the patient. She reports eating 3 meals per day, a typical portion size of greater than 1 cup, eating 1 snack per day, drinking 4-5, 8-oz. glasses of water per day. The patient can tolerate solid protein.   The patient is eating protein first. The patient is limiting food volume. The patient is taking vitamins. The patient is limiting snacking. The patient is exercising regularly. She is drinking carbonated beverages.     The following portions of the patient's history were reviewed and updated as appropriate: allergies, current medications, past family history, past medical history, past social history, past surgical history and problem list.    Vitals:    05/14/21 1401   BP: 135/85   Pulse: 61   Resp: 18   Temp: 97.1 °F (36.2 °C)       Review of Systems   Constitutional: Positive for appetite change. Negative for fatigue and unexpected weight change.   HENT: Negative.    Eyes: Negative.    Respiratory: Negative.    Cardiovascular:  Negative.  Negative for leg swelling.   Gastrointestinal: Negative for abdominal distention, abdominal pain, constipation, diarrhea, nausea and vomiting.   Genitourinary: Negative for difficulty urinating, frequency and urgency.   Musculoskeletal: Negative for back pain.   Skin: Negative.    Psychiatric/Behavioral: Negative.    All other systems reviewed and are negative.      Physical Exam  Vitals and nursing note reviewed.   Constitutional:       Appearance: She is well-developed.   Neck:      Thyroid: No thyromegaly.   Cardiovascular:      Rate and Rhythm: Normal rate and regular rhythm.      Heart sounds: Normal heart sounds.   Pulmonary:      Effort: Pulmonary effort is normal. No respiratory distress.      Breath sounds: Normal breath sounds. No wheezing.   Abdominal:      General: Bowel sounds are normal. There is no distension.      Palpations: Abdomen is soft.      Tenderness: There is no abdominal tenderness. There is no guarding.      Hernia: No hernia is present.   Musculoskeletal:         General: No tenderness.   Skin:     General: Skin is warm and dry.      Findings: No erythema or rash.   Neurological:      Mental Status: She is alert.   Psychiatric:         Behavior: Behavior normal.         Assessment: Post-operatively the patient is doing well    Encounter Diagnoses   Name Primary?   • Obesity, Class III, BMI 40-49.9 (morbid obesity) (CMS/[facility]) Yes   • Gastroesophageal reflux disease without esophagitis    • Vitamin D deficiency    • Iron deficiency anemia due to chronic blood loss    • Multiple joint pain    • Chronic fatigue        Plan:     I think she would benefit from additional band restriction.  Under aseptic conditions, I accessed the port and 0.8mls of fluid were added for a total of 2.8mls.  She was able to tolerate a glass of water at the conclusion of the fill.  She was advised to consume soft solids for 24 hours before advancing back to a regular diet.  RTC for n/v/d/regurg.     We  discussed her protein sources and that eating dense solid protein along with plenty of vegetables and fresh fruits is important for weight loss. Reviewed appropriate water intake- half of body weight in ounces and exercise routine- minimum of 150 minutes per with including both cardio and strength training. Instructed not to drink with meals and wait 45 minutes after each meal before drinking.    She should follow-up in 3 month     Activity restrictions: None.   Instructions / Recommendations: dietary counseling recommended, recommended a daily protein intake of  grams, patient was advised that the lap band system works best when consuming solid foods, vitamin supplement(s) recommended, recommended exercising at least 150 minutes per week, behavior modifications recommended and instructed to call the office for concerns, questions, or problems.    Total time spent with the patient was approximately 25 minutes.  Chart was also reviewed prior to starting the visit. The patient was counseled regarding the LAP-BAND and how the band works.

## 2021-06-09 RX ORDER — LEVOTHYROXINE SODIUM 0.05 MG/1
TABLET ORAL
Qty: 90 TABLET | Refills: 0 | Status: SHIPPED | OUTPATIENT
Start: 2021-06-09 | End: 2021-08-20 | Stop reason: SDUPTHER

## 2021-07-12 RX ORDER — ESCITALOPRAM OXALATE 20 MG/1
TABLET ORAL
Qty: 90 TABLET | Refills: 0 | Status: SHIPPED | OUTPATIENT
Start: 2021-07-12 | End: 2021-08-20 | Stop reason: SDUPTHER

## 2021-08-20 ENCOUNTER — OFFICE VISIT (OUTPATIENT)
Dept: FAMILY MEDICINE CLINIC | Facility: CLINIC | Age: 51
End: 2021-08-20

## 2021-08-20 VITALS
HEIGHT: 66 IN | DIASTOLIC BLOOD PRESSURE: 78 MMHG | OXYGEN SATURATION: 98 % | TEMPERATURE: 98.5 F | HEART RATE: 64 BPM | WEIGHT: 256.2 LBS | BODY MASS INDEX: 41.17 KG/M2 | SYSTOLIC BLOOD PRESSURE: 124 MMHG

## 2021-08-20 DIAGNOSIS — M12.812 ROTATOR CUFF ARTHROPATHY OF LEFT SHOULDER: ICD-10-CM

## 2021-08-20 DIAGNOSIS — D50.0 IRON DEFICIENCY ANEMIA DUE TO CHRONIC BLOOD LOSS: ICD-10-CM

## 2021-08-20 DIAGNOSIS — E55.9 VITAMIN D DEFICIENCY: ICD-10-CM

## 2021-08-20 DIAGNOSIS — K21.9 GASTROESOPHAGEAL REFLUX DISEASE WITHOUT ESOPHAGITIS: ICD-10-CM

## 2021-08-20 DIAGNOSIS — E03.9 HYPOTHYROIDISM, UNSPECIFIED TYPE: ICD-10-CM

## 2021-08-20 DIAGNOSIS — H66.90 ACUTE OTITIS MEDIA, UNSPECIFIED OTITIS MEDIA TYPE: ICD-10-CM

## 2021-08-20 DIAGNOSIS — R63.5 WEIGHT GAIN: ICD-10-CM

## 2021-08-20 DIAGNOSIS — F41.9 ANXIETY: Primary | ICD-10-CM

## 2021-08-20 PROBLEM — F41.8 ANXIETY WITH DEPRESSION: Status: ACTIVE | Noted: 2021-08-20

## 2021-08-20 PROCEDURE — 99214 OFFICE O/P EST MOD 30 MIN: CPT | Performed by: FAMILY MEDICINE

## 2021-08-20 RX ORDER — LEVOTHYROXINE SODIUM 0.05 MG/1
50 TABLET ORAL DAILY
Qty: 90 TABLET | Refills: 1 | Status: SHIPPED | OUTPATIENT
Start: 2021-08-20 | End: 2022-02-23

## 2021-08-20 RX ORDER — OMEPRAZOLE 20 MG/1
20 CAPSULE, DELAYED RELEASE ORAL DAILY
Qty: 90 CAPSULE | Refills: 1 | Status: SHIPPED | OUTPATIENT
Start: 2021-08-20 | End: 2022-02-23

## 2021-08-20 RX ORDER — DICLOFENAC SODIUM 75 MG/1
75 TABLET, DELAYED RELEASE ORAL 2 TIMES DAILY
Qty: 60 TABLET | Refills: 1 | Status: SHIPPED | OUTPATIENT
Start: 2021-08-20 | End: 2021-10-23

## 2021-08-20 RX ORDER — AZITHROMYCIN 250 MG/1
TABLET, FILM COATED ORAL
Qty: 1 TABLET | Refills: 0 | Status: SHIPPED | OUTPATIENT
Start: 2021-08-20 | End: 2022-05-18

## 2021-08-20 RX ORDER — ESCITALOPRAM OXALATE 20 MG/1
20 TABLET ORAL DAILY
Qty: 90 TABLET | Refills: 1 | Status: SHIPPED | OUTPATIENT
Start: 2021-08-20 | End: 2022-02-25 | Stop reason: SDUPTHER

## 2021-08-20 NOTE — PROGRESS NOTES
"Subjective   Kymberly Edwards is a 51 y.o. female.     Vitals:    08/20/21 1440   BP: 124/78   Pulse: 64   Temp: 98.5 °F (36.9 °C)   SpO2: 98%        Chief Complaint   Patient presents with   • Hypothyroidism     FOLLOW UP LAST OFFICE VISIT   • Anemia   • Anxiety   • Earache     FEELS LOKE FLUID IN EARS   • Arm Pain     LEFT ARM PAIN NKI        History of Present Illness    6-month follow-up on ERNESTO, hypothyroidism, iron deficiency anemia, GERD, and complaints of right ear pain and left arm pain    LOV with me in January for new onset iron deficiency anemia, insomnia, and routine med refills.  At that visit --her CBC was repeated, normal and she was asked to follow-up with her GYN doctor to get her C-scope as planned.  Unfortunately, still has not done the C-scope due to scheduling conflicts.  Also, at last visit Vistaril was added for insomnia --unfortunately, this had side effects.  Currently taking melatonin.    Doing okay all considering.  Unfortunately, has had 3 deaths in the family during recent months.  Thus, lots of stress eating and weight gain of nearly 20 pounds in the last 6 months.  Has a few complaints today --   --Complains of left arm pain x6 months.  States this has been intermittent.  No known injury.  Does do a lot of computer work.  Mostly hurts in the middle of her arm and radiates up to her shoulder, especially worse at night.  Has tried heat and Tylenol which do help.  --Complains of right ear pain x2 weeks.  Thinks she has \"swimmer's ear\".  No drainage.  Does use Q-tips.  No URI symptoms.    Otherwise, just needs all refills.  Due for yearly labs, fasting.  Compliant with and tolerates all medications without side effects.  No menses since April 2021.    The following portions of the patient's history were reviewed and updated as appropriate: allergies, current medications, past family history, past medical history, past social history, past surgical history and problem list.    Review of Systems "   Constitutional: Negative for fever, unexpected weight gain and unexpected weight loss.   Respiratory: Negative for cough and shortness of breath.    Cardiovascular: Negative for chest pain.       Objective   Physical Exam  Vitals and nursing note reviewed.   Constitutional:       Appearance: Normal appearance. She is well-developed. She is obese.   HENT:      Head: Normocephalic and atraumatic.      Comments: Right ear --slightly erythematous, slightly bulging TM     Right Ear: There is no impacted cerumen.      Nose: Nose normal.   Eyes:      Conjunctiva/sclera: Conjunctivae normal.      Pupils: Pupils are equal, round, and reactive to light.   Neck:      Thyroid: No thyromegaly.   Cardiovascular:      Rate and Rhythm: Normal rate and regular rhythm.      Heart sounds: Normal heart sounds. No murmur heard.     Pulmonary:      Effort: Pulmonary effort is normal.      Breath sounds: Normal breath sounds.   Abdominal:      General: Abdomen is flat. Bowel sounds are normal. There is no distension.      Palpations: Abdomen is soft. There is no hepatomegaly, splenomegaly or mass.      Tenderness: There is no abdominal tenderness. There is no guarding or rebound.      Hernia: No hernia is present.   Musculoskeletal:         General: Normal range of motion.      Cervical back: Normal range of motion and neck supple.      Right lower leg: No edema.      Left lower leg: No edema.      Comments: Left shoulder --mild tenderness to palpation in deltoid, normal range of motion and strength except for slight tenderness with external rotation.   Lymphadenopathy:      Cervical: No cervical adenopathy.   Skin:     General: Skin is warm.   Neurological:      General: No focal deficit present.      Mental Status: She is alert.   Psychiatric:         Mood and Affect: Mood normal.         Behavior: Behavior normal.         Thought Content: Thought content normal.         Judgment: Judgment normal.          LABS/STUDIES  -January 2021  --Hgb 12.9  September 2020 --- normal TSH, normal lipids    Procedures     Assessment/Plan   Diagnoses and all orders for this visit:    1. Anxiety (Primary)  -controlled.  No change of medication  Refill Lexapro 20 mg daily    2. Hypothyroidism, unspecified type  -controlled?  Asymptomatic except for weight gain --although, likely related to stress eating.  Due to recheck lipids and TSH today.  If TSH is therapeutic then no change with Synthroid 50 mcg every morning, refilled today.  -     Lipid Panel With LDL / HDL Ratio  -     TSH    3. Iron deficiency anemia due to chronic blood loss  -resolved?  Recheck CBC today.  Hopefully resolved given no longer having heavy painful menses.  If Hgb is normal may DC iron.  Yet, still needs her C-scope --please schedule.  All paperwork has been completed  -     CBC & Differential    4. Vitamin D deficiency controlled?  Due to recheck vitamin D  Further recommendations to follow  -     Vitamin D 25 Hydroxy    5. Gastroesophageal reflux disease without esophagitis stable.  Refill Prilosec 20 mg 1 p.o. daily as needed    6. Weight gain  -uncontrolled.  Recheck TSH today  Again, stressed the importance to improve upon a low-cholesterol/low carb diet and regular cardio exercise greater than 150 minutes weekly!  -     Lipid Panel With LDL / HDL Ratio    7. Rotator cuff arthropathy of left shoulder  -new diagnosis.  Mild.  Likely rotator cuff tendinitis vs OA vs mild adhesive capsulitis?  Start Voltaren 75 mg 1 p.o. twice daily x10 days then as needed.  If not better follow back up for x-ray, may need PT?  -     Basic Metabolic Panel    8. Acute otitis media, unspecified otitis media type  -acute diagnosis.  Start Z-Yohannes as directed below    Other orders  -     escitalopram (LEXAPRO) 20 MG tablet; Take 1 tablet by mouth Daily.  Dispense: 90 tablet; Refill: 1  -     levothyroxine (SYNTHROID, LEVOTHROID) 50 MCG tablet; Take 1 tablet by mouth Daily.  Dispense: 90 tablet; Refill: 1  -      omeprazole (priLOSEC) 20 MG capsule; Take 1 capsule by mouth Daily.  Dispense: 90 capsule; Refill: 1  -     azithromycin (Zithromax Z-Yohannes) 250 MG tablet; Take 2 tablets the first day, then 1 tablet daily for 4 days.  Dispense: 1 tablet; Refill: 0  -     diclofenac (VOLTAREN) 75 MG EC tablet; Take 1 tablet by mouth 2 (Two) Times a Day.  Dispense: 60 tablet; Refill: 1    If all labs WNL and doing better then may follow-up in 6 months.  Otherwise needs to follow-up sooner             Wore goggles and face mask during entire visit.    Return in about 6 months (around 2/20/2022) for Recheck, Annual physical.

## 2021-08-20 NOTE — PATIENT INSTRUCTIONS
Recommend low fat/low calorie diet and exercise greater than 150 minutes of cardio per week.   Continue current treatment plan.    Start Z-Yohannes as directed  Start diclofenac 75 mg twice daily x10 to 14 days  If not better follow-up sooner than next regular appointment.

## 2021-08-21 LAB
25(OH)D3+25(OH)D2 SERPL-MCNC: 23.9 NG/ML (ref 30–100)
BASOPHILS # BLD AUTO: 0.03 10*3/MM3 (ref 0–0.2)
BASOPHILS NFR BLD AUTO: 0.7 % (ref 0–1.5)
BUN SERPL-MCNC: 10 MG/DL (ref 6–20)
BUN/CREAT SERPL: 11.9 (ref 7–25)
CALCIUM SERPL-MCNC: 9.7 MG/DL (ref 8.6–10.5)
CHLORIDE SERPL-SCNC: 107 MMOL/L (ref 98–107)
CHOLEST SERPL-MCNC: 196 MG/DL (ref 0–200)
CO2 SERPL-SCNC: 25.6 MMOL/L (ref 22–29)
CREAT SERPL-MCNC: 0.84 MG/DL (ref 0.57–1)
EOSINOPHIL # BLD AUTO: 0.1 10*3/MM3 (ref 0–0.4)
EOSINOPHIL NFR BLD AUTO: 2.2 % (ref 0.3–6.2)
ERYTHROCYTE [DISTWIDTH] IN BLOOD BY AUTOMATED COUNT: 13.4 % (ref 12.3–15.4)
GLUCOSE SERPL-MCNC: 80 MG/DL (ref 65–99)
HCT VFR BLD AUTO: 41.9 % (ref 34–46.6)
HDLC SERPL-MCNC: 55 MG/DL (ref 40–60)
HGB BLD-MCNC: 13.5 G/DL (ref 12–15.9)
IMM GRANULOCYTES # BLD AUTO: 0.02 10*3/MM3 (ref 0–0.05)
IMM GRANULOCYTES NFR BLD AUTO: 0.4 % (ref 0–0.5)
LDLC SERPL CALC-MCNC: 126 MG/DL (ref 0–100)
LDLC/HDLC SERPL: 2.27 {RATIO}
LYMPHOCYTES # BLD AUTO: 1.49 10*3/MM3 (ref 0.7–3.1)
LYMPHOCYTES NFR BLD AUTO: 32.3 % (ref 19.6–45.3)
MCH RBC QN AUTO: 29.1 PG (ref 26.6–33)
MCHC RBC AUTO-ENTMCNC: 32.2 G/DL (ref 31.5–35.7)
MCV RBC AUTO: 90.3 FL (ref 79–97)
MONOCYTES # BLD AUTO: 0.27 10*3/MM3 (ref 0.1–0.9)
MONOCYTES NFR BLD AUTO: 5.9 % (ref 5–12)
NEUTROPHILS # BLD AUTO: 2.7 10*3/MM3 (ref 1.7–7)
NEUTROPHILS NFR BLD AUTO: 58.5 % (ref 42.7–76)
NRBC BLD AUTO-RTO: 0 /100 WBC (ref 0–0.2)
PLATELET # BLD AUTO: 276 10*3/MM3 (ref 140–450)
POTASSIUM SERPL-SCNC: 4.3 MMOL/L (ref 3.5–5.2)
RBC # BLD AUTO: 4.64 10*6/MM3 (ref 3.77–5.28)
SODIUM SERPL-SCNC: 140 MMOL/L (ref 136–145)
TRIGL SERPL-MCNC: 80 MG/DL (ref 0–150)
TSH SERPL DL<=0.005 MIU/L-ACNC: 1.72 UIU/ML (ref 0.27–4.2)
VLDLC SERPL CALC-MCNC: 15 MG/DL (ref 5–40)
WBC # BLD AUTO: 4.61 10*3/MM3 (ref 3.4–10.8)

## 2021-09-24 ENCOUNTER — TELEPHONE (OUTPATIENT)
Dept: FAMILY MEDICINE CLINIC | Facility: CLINIC | Age: 51
End: 2021-09-24

## 2021-09-24 DIAGNOSIS — M12.812 ROTATOR CUFF ARTHROPATHY OF LEFT SHOULDER: Primary | ICD-10-CM

## 2021-09-24 NOTE — TELEPHONE ENCOUNTER
PATIENT IS STILL HAVING PAIN IN ROTATOR CUFF. WOULD LIKE TO BE REFERRED TO PHYSICAL THERAPY.    PLEASE ADVISE  [phone]

## 2021-10-12 ENCOUNTER — TREATMENT (OUTPATIENT)
Dept: PHYSICAL THERAPY | Facility: CLINIC | Age: 51
End: 2021-10-12

## 2021-10-12 DIAGNOSIS — M12.812 ROTATOR CUFF ARTHROPATHY OF LEFT SHOULDER: Primary | ICD-10-CM

## 2021-10-12 PROCEDURE — 97110 THERAPEUTIC EXERCISES: CPT | Performed by: PHYSICAL THERAPIST

## 2021-10-12 PROCEDURE — 97161 PT EVAL LOW COMPLEX 20 MIN: CPT | Performed by: PHYSICAL THERAPIST

## 2021-10-12 NOTE — PROGRESS NOTES
Physical Therapy Initial Evaluation and Plan of Care      Subjective Evaluation    History of Present Illness  Mechanism of injury: Patient reports that the shoulder has bothered her for several months.  Denies an injury to the shoulder.  Reports that it started bothering her out of the blue.    No history of shoulder issues.      Patient Occupation: Office work   Precautions and Work Restrictions: normal job dutiesPain  Current pain ratin  At worst pain ratin  Location: left upper arm  Quality: dull ache and sharp  Relieving factors: ice and heat  Aggravating factors: lifting (sleeping on that side)  Progression: no change    Hand dominance: right             Objective          Palpation     Additional Palpation Details  TTP to the left lateral deltoid.    Active Range of Motion   Left Shoulder   Flexion: Left shoulder active forward flexion: WNL. with pain  Abduction: Left shoulder active abduction: WNL. with pain  External rotation 0°: Left shoulder active external rotation at 0 degrees: WNL. with pain  Internal rotation BTB: L5 with pain    Strength/Myotome Testing     Left Shoulder     Planes of Motion   Flexion: 4   Abduction: 4   External rotation at 0°: 4   Internal rotation at 0°: 4+     Isolated Muscles   Supraspinatus: 4     Tests   Cervical     Left   Negative active compression (Worth).     Left Shoulder   Positive empty can and Hawkin's.           Assessment & Plan     Assessment  Impairments: abnormal or restricted ROM, activity intolerance, impaired physical strength, lacks appropriate home exercise program and pain with function  Assessment details: Patient presents with c/o pain, TTP, limited AROM for functional IR, decreased strength and positive special testing which is limiting her ability to perform ADL'S.  Barriers to therapy: none  Prognosis: good  Prognosis details: STG's  1)  Independent with HEP  2)  Decrease pain by 50% or more  3)  AROM WNL for functional IR    LTG's   1)   Independent with HEP progression  2)  Decrease pain by 75% or more  3)  Increase strength for the left shoulder to 4+/5  4)  Negative special testing  5)  No TTP present  6)  Patient to perform ADL'S without limitations       Plan  Therapy options: will be seen for skilled physical therapy services  Planned modality interventions: TENS and cryotherapy  Planned therapy interventions: strengthening, stretching, therapeutic activities and home exercise program  Treatment plan discussed with: patient        Manual Therapy:    0     mins  96786;  Therapeutic Exercise:    15     mins  94493;    Neuromuscular John:    0    mins  40983;    Therapeutic Activity:     0     mins  30571;     Gait Trainin     mins  28190;     Ultrasound:     0     mins  85787;    Work Hardening           0      mins 03267  Iontophoresis               0   mins 69248    Timed Treatment:   15   mins   Total Treatment:     30   mins    PT SIGNATURE: Mark Tran, PT   DATE TREATMENT INITIATED: 10/12/2021    Initial Certification  Certification Period: 1/10/2022  I certify that the therapy services are furnished while this patient is under my care.  The services outlined above are required by this patient, and will be reviewed every 90 days.     PHYSICIAN: Corrina Woodall MD      DATE:     Please sign and return via fax to [phone].. Thank you, [facility].

## 2021-10-19 ENCOUNTER — TELEPHONE (OUTPATIENT)
Dept: PHYSICAL THERAPY | Facility: CLINIC | Age: 51
End: 2021-10-19

## 2021-10-22 NOTE — TELEPHONE ENCOUNTER
Rx Refill Note  Requested Prescriptions     Pending Prescriptions Disp Refills   • diclofenac (VOLTAREN) 75 MG EC tablet [Pharmacy Med Name: DICLOFENAC SOD EC 75 MG TAB] 60 tablet 1     Sig: TAKE 1 TABLET BY MOUTH TWICE A DAY      Last office visit with prescribing clinician: 8/20/2021      Next office visit with prescribing clinician: 2/25/2022            Halley Sanchez MA/SERAFIN  10/22/21, 08:42 EDT

## 2021-10-23 RX ORDER — DICLOFENAC SODIUM 75 MG/1
TABLET, DELAYED RELEASE ORAL
Qty: 60 TABLET | Refills: 1 | Status: SHIPPED | OUTPATIENT
Start: 2021-10-23 | End: 2021-12-23

## 2021-11-01 ENCOUNTER — TREATMENT (OUTPATIENT)
Dept: PHYSICAL THERAPY | Facility: CLINIC | Age: 51
End: 2021-11-01

## 2021-11-01 DIAGNOSIS — M12.812 ROTATOR CUFF ARTHROPATHY OF LEFT SHOULDER: Primary | ICD-10-CM

## 2021-11-01 PROCEDURE — 97110 THERAPEUTIC EXERCISES: CPT | Performed by: PHYSICAL THERAPIST

## 2021-11-01 NOTE — PROGRESS NOTES
Physical Therapy Daily Progress Note            Subjective  Patient reports that the exercises are helping.  Reports that the pain is less in the shoulder.    Objective   See Exercise, Manual, and Modality Logs for complete treatment.       Assessment/Plan  Subjective reports are improved from the previous visit.  Patient tolerated the progression of exercises without pain or discomfort in the shoulder.  Plan to progress the scapular stabilization exercises and OH activities.                   Manual Therapy:    0     mins  77899;  Therapeutic Exercise:    25     mins  05576;     Neuromuscular John:    0    mins  55064;    Therapeutic Activity:     0     mins  10489;     Gait Trainin     mins  87782;     Ultrasound:     0     mins  32518;    Work Hardening           0      mins 21488  Iontophoresis               0   mins 25989    Timed Treatment:   25   mins   Total Treatment:     25   mins    Mark Tran, PT  Physical Therapist

## 2021-11-08 ENCOUNTER — TREATMENT (OUTPATIENT)
Dept: PHYSICAL THERAPY | Facility: CLINIC | Age: 51
End: 2021-11-08

## 2021-11-08 DIAGNOSIS — M12.812 ROTATOR CUFF ARTHROPATHY OF LEFT SHOULDER: Primary | ICD-10-CM

## 2021-11-08 PROCEDURE — 97110 THERAPEUTIC EXERCISES: CPT | Performed by: PHYSICAL THERAPIST

## 2021-11-08 NOTE — PROGRESS NOTES
Physical Therapy Daily Progress Note            Subjective  Patient reports that the shoulder pain is better, reports that it isn't bothering her as much at night; reports more ROM.    Objective   See Exercise, Manual, and Modality Logs for complete treatment.       Assessment/Plan  Subjective reports continue to improve.  Patient tolerated the progression of strengthening exercises without reports of pain in the shoulder.  Plan to continue PT 1 more visit, then D/C to [facility].                   Manual Therapy:    0     mins  26944;  Therapeutic Exercise:    24     mins  98505;     Neuromuscular John:    0    mins  00696;    Therapeutic Activity:     0     mins  47004;     Gait Trainin     mins  69472;     Ultrasound:     0     mins  53995;    Work Hardening           0      mins 18443  Iontophoresis               0   mins 51979    Timed Treatment:   24   mins   Total Treatment:     24   mins    Mark Tran, PT  Physical Therapist

## 2021-12-08 ENCOUNTER — DOCUMENTATION (OUTPATIENT)
Dept: PHYSICAL THERAPY | Facility: CLINIC | Age: 51
End: 2021-12-08

## 2021-12-23 RX ORDER — DICLOFENAC SODIUM 75 MG/1
TABLET, DELAYED RELEASE ORAL
Qty: 60 TABLET | Refills: 1 | Status: SHIPPED | OUTPATIENT
Start: 2021-12-23 | End: 2022-02-25

## 2022-02-23 RX ORDER — OMEPRAZOLE 20 MG/1
CAPSULE, DELAYED RELEASE ORAL
Qty: 90 CAPSULE | Refills: 0 | Status: SHIPPED | OUTPATIENT
Start: 2022-02-23 | End: 2022-02-25 | Stop reason: SDUPTHER

## 2022-02-23 RX ORDER — LEVOTHYROXINE SODIUM 0.05 MG/1
TABLET ORAL
Qty: 90 TABLET | Refills: 0 | Status: SHIPPED | OUTPATIENT
Start: 2022-02-23 | End: 2022-02-25 | Stop reason: SDUPTHER

## 2022-02-25 ENCOUNTER — OFFICE VISIT (OUTPATIENT)
Dept: FAMILY MEDICINE CLINIC | Facility: CLINIC | Age: 52
End: 2022-02-25

## 2022-02-25 VITALS
TEMPERATURE: 97.1 F | HEART RATE: 60 BPM | OXYGEN SATURATION: 99 % | SYSTOLIC BLOOD PRESSURE: 110 MMHG | WEIGHT: 260 LBS | HEIGHT: 66 IN | BODY MASS INDEX: 41.78 KG/M2 | DIASTOLIC BLOOD PRESSURE: 70 MMHG

## 2022-02-25 DIAGNOSIS — E03.9 HYPOTHYROIDISM, UNSPECIFIED TYPE: ICD-10-CM

## 2022-02-25 DIAGNOSIS — Z12.11 ENCOUNTER FOR SCREENING COLONOSCOPY: ICD-10-CM

## 2022-02-25 DIAGNOSIS — Z23 FLU VACCINE NEED: ICD-10-CM

## 2022-02-25 DIAGNOSIS — Z00.00 WELLNESS EXAMINATION: Primary | ICD-10-CM

## 2022-02-25 DIAGNOSIS — K21.9 GASTROESOPHAGEAL REFLUX DISEASE WITHOUT ESOPHAGITIS: ICD-10-CM

## 2022-02-25 DIAGNOSIS — E55.9 VITAMIN D DEFICIENCY: ICD-10-CM

## 2022-02-25 DIAGNOSIS — F41.8 ANXIETY WITH DEPRESSION: ICD-10-CM

## 2022-02-25 DIAGNOSIS — G47.00 INSOMNIA, UNSPECIFIED TYPE: ICD-10-CM

## 2022-02-25 PROBLEM — M75.82 TENDINITIS OF LEFT ROTATOR CUFF: Status: ACTIVE | Noted: 2022-02-25

## 2022-02-25 PROCEDURE — 90471 IMMUNIZATION ADMIN: CPT | Performed by: FAMILY MEDICINE

## 2022-02-25 PROCEDURE — 99396 PREV VISIT EST AGE 40-64: CPT | Performed by: FAMILY MEDICINE

## 2022-02-25 PROCEDURE — 99214 OFFICE O/P EST MOD 30 MIN: CPT | Performed by: FAMILY MEDICINE

## 2022-02-25 PROCEDURE — 90686 IIV4 VACC NO PRSV 0.5 ML IM: CPT | Performed by: FAMILY MEDICINE

## 2022-02-25 RX ORDER — ESCITALOPRAM OXALATE 20 MG/1
20 TABLET ORAL DAILY
Qty: 90 TABLET | Refills: 1 | Status: SHIPPED | OUTPATIENT
Start: 2022-02-25 | End: 2022-08-26 | Stop reason: SDUPTHER

## 2022-02-25 RX ORDER — HYDROXYZINE HYDROCHLORIDE 25 MG/1
TABLET, FILM COATED ORAL
Qty: 90 TABLET | Refills: 1 | Status: SHIPPED | OUTPATIENT
Start: 2022-02-25 | End: 2022-05-18

## 2022-02-25 RX ORDER — LEVOTHYROXINE SODIUM 0.05 MG/1
50 TABLET ORAL DAILY
Qty: 90 TABLET | Refills: 1 | Status: SHIPPED | OUTPATIENT
Start: 2022-02-25 | End: 2022-08-26 | Stop reason: SDUPTHER

## 2022-02-25 RX ORDER — ERGOCALCIFEROL 1.25 MG/1
50000 CAPSULE ORAL WEEKLY
Qty: 4 CAPSULE | Refills: 3 | Status: SHIPPED | OUTPATIENT
Start: 2022-02-25 | End: 2023-03-03

## 2022-02-25 RX ORDER — OMEPRAZOLE 20 MG/1
20 CAPSULE, DELAYED RELEASE ORAL DAILY
Qty: 90 CAPSULE | Refills: 1 | Status: SHIPPED | OUTPATIENT
Start: 2022-02-25 | End: 2022-08-26

## 2022-02-25 NOTE — PROGRESS NOTES
Chief Complaint  Annual Exam (YEARLY WELLNESS HAS GYN FOR PAP MAMMOGRAM ETC. NEVER HAD COLONOSCOPY PATIENT IS FASTING), Hypothyroidism (Complains of poor sleep), Anxiety, and Med Refill     PRESENTS FOR ANNUAL WELLNESS EXAM  And  6-month follow-up for ERNESTO with depression, hypothyroidism, GERD, and complaints of poor sleep    LOV with me in August for chronic med refills with labs and new DX of rotator cuff arthropathy.  At that visit, the only medication change made was --adding Voltaren twice daily x2 weeks and referral to physical therapy for rotator cuff tendinitis.  Otherwise, all labs were WNL and no medication changes were made.  Yet, her vitamin D level was slightly low--and it was recommended that she start OTC vitamin D; however, never did do this.    Overall, continues to do well all considering.  Unfortunately, she has had several deaths in her family/friends within the last several months and subsequently has not done a good job maintaining a healthy diet and regular exercise.  Weight gain of 4 pounds noted since last visit in August.  She does have plans to get back on track this spring, as things have finally settled down.  Still working full-time.  Left shoulder/rotator cuff tendinitis has resolved since treating with NSAIDs and PT.    Only complaint today is --- poor sleep.  Complains of difficulty falling asleep.  She has had this problem for many years, yet getting worse.  Now menopausal.  Denies hot flashes (on Lexapro.)  She has tried various doses of melatonin and ZzzQuil.  ERNESTO with depression is well controlled with Lexapro.    Otherwise, just needs chronic med refills today.  Compliant with and tolerates all medications without side effects.  No longer taking Voltaren, Vistaril, or OTC vitamin D.    Routine health maintenance/screening test:  PAP --- has GYN doc, up-to-date  MAMMO --- 2021 at GYN doctor office, normal  DEXA --- nonapplicable, no menses since April 2021  Colorectal Screen ---  "still not completed, referral done 1 year ago, needs new referral now  Vaccines --- all vaccines up-to-date, COVID booster not due until late March early April except needs shingles and influenza vaccine  Smoking/ETOH Status --- non-smoker, no alcohol use  Dentist, Eye Exam, Derm --- maintains regular visits with dentist, eye exams, and dermatologist  Diet/Exercise --- poor diet in recent months, lack of exercise  Pertinent FH --- negative for colon cancer, premature CAD, breast cancer    Review of Systems   Constitutional: Negative for fever and unexpected weight change.   Respiratory: Negative for cough and shortness of breath.    Cardiovascular: Negative for chest pain.        Subjective          Kymberly Edwards presents to [facility]    Objective   Vital Signs:   Vitals:    02/25/22 1332   BP: 110/70   Pulse: 60   Temp: 97.1 °F (36.2 °C)   SpO2: 99%   Weight: 118 kg (260 lb)   Height: 166.4 cm (65.5\")      Physical Exam  Vitals and nursing note reviewed.   Constitutional:       Appearance: Normal appearance. She is well-developed. She is obese.   HENT:      Head: Normocephalic and atraumatic.      Nose: Nose normal.   Eyes:      Conjunctiva/sclera: Conjunctivae normal.      Pupils: Pupils are equal, round, and reactive to light.   Neck:      Thyroid: No thyromegaly.   Cardiovascular:      Rate and Rhythm: Normal rate and regular rhythm.      Heart sounds: Normal heart sounds. No murmur heard.      Pulmonary:      Effort: Pulmonary effort is normal.      Breath sounds: Normal breath sounds.   Abdominal:      General: Abdomen is flat. Bowel sounds are normal. There is no distension.      Palpations: Abdomen is soft. There is no hepatomegaly, splenomegaly or mass.      Tenderness: There is no abdominal tenderness. There is no guarding or rebound.      Hernia: No hernia is present.   Musculoskeletal:         General: Normal range of motion.      Cervical back: Normal range of motion and " neck supple.      Right lower leg: No edema.      Left lower leg: No edema.   Lymphadenopathy:      Cervical: No cervical adenopathy.   Skin:     General: Skin is warm.   Neurological:      General: No focal deficit present.      Mental Status: She is alert.   Psychiatric:         Mood and Affect: Mood normal.         Behavior: Behavior normal.         Thought Content: Thought content normal.         Judgment: Judgment normal.        Result Review :     CMP    CMP 8/20/21   Glucose 80   BUN 10   Creatinine 0.84   eGFR Non  Am 71   eGFR African Am 87   Sodium 140   Potassium 4.3   Chloride 107   Calcium 9.7      Comments are available for some flowsheets but are not being displayed.           CBC w/diff    CBC w/Diff 8/20/21   WBC 4.61   RBC 4.64   Hemoglobin 13.5   Hematocrit 41.9   MCV 90.3   MCH 29.1   MCHC 32.2   RDW 13.4   Platelets 276   Neutrophil Rel % 58.5   Lymphocyte Rel % 32.3   Monocyte Rel % 5.9   Eosinophil Rel % 2.2   Basophil Rel % 0.7           Lipid Panel    Lipid Panel 8/20/21   Total Cholesterol 196   Triglycerides 80   HDL Cholesterol 55   VLDL Cholesterol 15   LDL Cholesterol  126 (A)   LDL/HDL Ratio 2.27   (A) Abnormal value       Comments are available for some flowsheets but are not being displayed.           TSH    TSH 8/20/21   TSH 1.720               August 2021 --- vitamin D level 23         Assessment and Plan    Diagnoses and all orders for this visit:    1. Wellness examination (Primary)  -within normal limits except for BMI  All screening up-to-date except for needs colorectal screening (referral for C-scope done again,) influenza vaccine, and shingles vaccine (plans to obtain from pharmacy.)  See orders below.  Otherwise, weight loss needed --Needs low-carb/low calorie/low cholesterol diet and increase cardio exercise to greater than 150 minutes weekly    2. Encounter for screening colonoscopy  -     Ambulatory Referral For Screening Colonoscopy    3. Insomnia, unspecified  type  -uncontrolled/new DX  Failed melatonin and OTC meds.  Strongly recommend starting a routine/regular cardio exercise program.  Will prescribe Atarax 25 mg 1 p.o. nightly, may continue melatonin as needed.    4. Vitamin D deficiency  -new DX  Start vitamin D 50,000 units 1 p.o. weekly x4 months during winter months/early spring.  Then may change to OTC vitamin D 3000 units daily  Plan to recheck vitamin D at next visit in 6 months    5. Anxiety with depression  -controlled.  No change in medication.  Refill Lexapro 20 mg 1 p.o. daily    6. Hypothyroidism, unspecified type  -stable.  TSH up-to-date and therapeutic.  No change with medication.  Refill Synthroid 50 mcg every morning    7. Gastroesophageal reflux disease without esophagitis  -stable.  Refill Prilosec 20 mg 1 p.o. daily as needed    Other orders  -     levothyroxine (SYNTHROID, LEVOTHROID) 50 MCG tablet; Take 1 tablet by mouth Daily.  Dispense: 90 tablet; Refill: 1  -     escitalopram (LEXAPRO) 20 MG tablet; Take 1 tablet by mouth Daily.  Dispense: 90 tablet; Refill: 1  -     omeprazole (priLOSEC) 20 MG capsule; Take 1 capsule by mouth Daily.  Dispense: 90 capsule; Refill: 1  -     vitamin D (ERGOCALCIFEROL) 1.25 MG (43703 UT) capsule capsule; Take 1 capsule by mouth 1 (One) Time Per Week.  Dispense: 4 capsule; Refill: 3  -     hydrOXYzine (ATARAX) 25 MG tablet; ONE PO QHS  Dispense: 90 tablet; Refill: 1        Follow Up   Return in about 6 months (around 8/25/2022) for Recheck.  Patient was given instructions and counseling regarding her condition or for health maintenance advice. Please see specific information pulled into the AVS if appropriate.

## 2022-02-25 NOTE — PATIENT INSTRUCTIONS
Needs to get C-scope as ordered    May start vitamin D 50,000 units 1 p.o. weekly x4 months    May start Vistaril 25 mg nightly for sleep    Recommend low fat/low calorie diet and exercise greater than 150 minutes of cardio per week.   Continue current treatment plan.

## 2022-02-28 ENCOUNTER — PRE-PROCEDURE SCREENING (OUTPATIENT)
Dept: GASTROENTEROLOGY | Facility: CLINIC | Age: 52
End: 2022-02-28

## 2022-02-28 NOTE — TELEPHONE ENCOUNTER
Colonoscopy screening--No personal history of polyps-- Family history of polyps or colon cancer--No blood thinners--Medications:          azithromycin (Zithromax Z-Yohannes) 250 MG tablet  escitalopram (LEXAPRO) 20 MG tablet  ferrous sulfate 324 (65 Fe) MG tablet delayed-release EC tablet  hydrOXYzine (ATARAX) 25 MG tablet  levothyroxine (SYNTHROID, LEVOTHROID) 50 MCG tablet  Melatonin 3 MG capsule  omeprazole (priLOSEC) 20 MG capsule  vitamin D (ERGOCALCIFEROL) 1.25 MG (18913 UT) capsule capsule            Pt verbalized and understood that it would be few weeks before been schedule

## 2022-03-01 DIAGNOSIS — Z12.11 SCREENING FOR COLORECTAL CANCER: Primary | ICD-10-CM

## 2022-03-01 DIAGNOSIS — Z12.12 SCREENING FOR COLORECTAL CANCER: Primary | ICD-10-CM

## 2022-03-14 ENCOUNTER — TELEPHONE (OUTPATIENT)
Dept: GASTROENTEROLOGY | Facility: CLINIC | Age: 52
End: 2022-03-14

## 2022-03-14 PROBLEM — Z12.12 SCREENING FOR COLORECTAL CANCER: Status: ACTIVE | Noted: 2022-03-14

## 2022-03-14 PROBLEM — Z12.11 SCREENING FOR COLORECTAL CANCER: Status: ACTIVE | Noted: 2022-03-14

## 2022-03-14 NOTE — TELEPHONE ENCOUNTER
SW Angelita for colonoscopy on  07/01/2022 arrive at  1030am  . Mailed Prep instructions to Mailing address on-file. ----[facility]    Advised PT  that  will call with final arrival time  24 hrs before procedure. If they do not get a phone call, arrival time will stay the same as given on instructions

## 2022-05-18 ENCOUNTER — APPOINTMENT (OUTPATIENT)
Dept: WOMENS IMAGING | Facility: HOSPITAL | Age: 52
End: 2022-05-18

## 2022-05-18 ENCOUNTER — PROCEDURE VISIT (OUTPATIENT)
Dept: OBSTETRICS AND GYNECOLOGY | Age: 52
End: 2022-05-18

## 2022-05-18 ENCOUNTER — OFFICE VISIT (OUTPATIENT)
Dept: OBSTETRICS AND GYNECOLOGY | Age: 52
End: 2022-05-18

## 2022-05-18 VITALS
HEIGHT: 66 IN | BODY MASS INDEX: 42.62 KG/M2 | WEIGHT: 265.2 LBS | SYSTOLIC BLOOD PRESSURE: 124 MMHG | DIASTOLIC BLOOD PRESSURE: 82 MMHG

## 2022-05-18 DIAGNOSIS — Z12.31 VISIT FOR SCREENING MAMMOGRAM: Primary | ICD-10-CM

## 2022-05-18 DIAGNOSIS — E66.01 MORBID OBESITY WITH BMI OF 40.0-44.9, ADULT: ICD-10-CM

## 2022-05-18 DIAGNOSIS — Z01.419 ENCOUNTER FOR GYNECOLOGICAL EXAMINATION WITHOUT ABNORMAL FINDING: Primary | ICD-10-CM

## 2022-05-18 PROCEDURE — 77063 BREAST TOMOSYNTHESIS BI: CPT | Performed by: OBSTETRICS & GYNECOLOGY

## 2022-05-18 PROCEDURE — 77063 BREAST TOMOSYNTHESIS BI: CPT | Performed by: RADIOLOGY

## 2022-05-18 PROCEDURE — 77067 SCR MAMMO BI INCL CAD: CPT | Performed by: RADIOLOGY

## 2022-05-18 PROCEDURE — 77067 SCR MAMMO BI INCL CAD: CPT | Performed by: OBSTETRICS & GYNECOLOGY

## 2022-05-18 PROCEDURE — 99386 PREV VISIT NEW AGE 40-64: CPT | Performed by: OBSTETRICS & GYNECOLOGY

## 2022-05-18 RX ORDER — MULTIPLE VITAMINS W/ MINERALS TAB 9MG-400MCG
1 TAB ORAL DAILY
COMMUNITY
End: 2023-03-03

## 2022-05-18 NOTE — PROGRESS NOTES
Subjective     Chief Complaint   Patient presents with   • Gynecologic Exam     New gyn daniel pt, mammo @11, last pap 2018, last mammo 2018, c/o no period in over a year, concerns about mood swings and nights sweats       History of Present Illness    Kymberly Edwards is a 51 y.o.  who presents for annual exam.  Patient is a new patient to me.  She previously saw Dr. Gilbert in 2018.  She has not had a mammogram since then.  She has had 2 deaths in the family of her nephew and father so she has not been taking too much care of her self.  She does have a history of a gastric band.  Her 2 children are ages 20 and 23.  She has a son and a daughter.  Her period stopped 1 year ago.  She did have some night sweats but they have resolved.  She is had no vaginal bleeding or pelvic pain.  Her mother's maiden name is actually Macey    Obstetric History:  OB History        2    Para   2    Term   2            AB        Living   2       SAB        IAB        Ectopic        Molar        Multiple        Live Births   2               Menstrual History:     Patient's last menstrual period was 2021 (within days).         Current contraception: none  History of abnormal Pap smear: no  Received Gardasil immunization: no  Perform regular self breast exam : yes  Family history of uterine or ovarian cancer: no  Family History of colon cancer: no  Family history of breast cancer: yes - MA 48     Mammogram: done today.  Colonoscopy: recommended. Scheduled   DEXA: not indicated.    Exercise: not active  Calcium/Vitamin D: adequate intake and uses supplements    The following portions of the patient's history were reviewed and updated as appropriate: allergies, current medications, past family history, past medical history, past social history, past surgical history and problem list.    Review of Systems    Review of Systems   Constitutional: +  for fatigue.   Respiratory: Negative for shortness of breath.   "  Gastrointestinal: Negative for abdominal pain.   Genitourinary: Negative for dysuria.   Neurological: Negative for headaches.   Psychiatric/Behavioral: Negative for dysphoric mood.     Objective   Physical Exam    /82   Ht 166.4 cm (65.5\")   Wt 120 kg (265 lb 3.2 oz)   LMP 04/14/2021 (Within Days)   Breastfeeding No   BMI 43.46 kg/m²     General:   alert, appears stated age and cooperative   Neck: thyroid normal to palpation   Heart: regular rate and rhythm   Lungs: clear to auscultation bilaterally   Abdomen: soft, non-tender, without masses or organomegaly   Breast: inspection negative, no nipple discharge or bleeding, no masses or nodularity palpable   Vulva: normal, Bartholin's, Urethra, [place]'s normal   Vagina: normal mucosa, normal discharge   Cervix: no cervical motion tenderness and no lesions   Uterus: non-tender, difficult to evaluate due to body habitus.   Adnexa: no mass, fullness, tenderness   Rectal: not indicated     Assessment & Plan   Diagnoses and all orders for this visit:    1. Encounter for gynecological examination without abnormal finding (Primary)  -     IGP, Aptima HPV, Rfx 16 / 18,45    2. Morbid obesity with BMI of 40.0-44.9, adult (HCC)  -     Hemoglobin A1c  -     Lipid Panel  -     TSH    Encouraged daily exercise and dietary changes.  Patient will see if this helps with her moods.  She could also try omega-3's.  Family history of breast cancer under the age of 50-myRisk testing was sent off today.    All questions answered.  Breast self exam technique reviewed and patient encouraged to perform self-exam monthly.  Discussed healthy lifestyle modifications.  Recommended 30 minutes of aerobic exercise five times per week.  Discussed calcium needs to prevent osteoporosis.                     "

## 2022-05-19 LAB
CHOLEST SERPL-MCNC: 204 MG/DL (ref 100–199)
HBA1C MFR BLD: 5.5 % (ref 4.8–5.6)
HDLC SERPL-MCNC: 56 MG/DL
LDLC SERPL CALC-MCNC: 129 MG/DL (ref 0–99)
TRIGL SERPL-MCNC: 107 MG/DL (ref 0–149)
TSH SERPL DL<=0.005 MIU/L-ACNC: 2.72 UIU/ML (ref 0.45–4.5)
VLDLC SERPL CALC-MCNC: 19 MG/DL (ref 5–40)

## 2022-05-24 LAB
CYTOLOGIST CVX/VAG CYTO: NORMAL
CYTOLOGY CVX/VAG DOC CYTO: NORMAL
CYTOLOGY CVX/VAG DOC THIN PREP: NORMAL
DX ICD CODE: NORMAL
HIV 1 & 2 AB SER-IMP: NORMAL
HPV I/H RISK 4 DNA CVX QL PROBE+SIG AMP: NEGATIVE
OTHER STN SPEC: NORMAL
STAT OF ADQ CVX/VAG CYTO-IMP: NORMAL

## 2022-06-07 ENCOUNTER — TELEPHONE (OUTPATIENT)
Dept: GASTROENTEROLOGY | Facility: CLINIC | Age: 52
End: 2022-06-07

## 2022-06-07 NOTE — TELEPHONE ENCOUNTER
Called pt to reschedule c/s on 07/01 w/ lavelle, he is on-danyelle at the hospital . No answer left Vm to call back, will try again

## 2022-07-01 RX ORDER — HYDROXYZINE HYDROCHLORIDE 25 MG/1
TABLET, FILM COATED ORAL
Qty: 90 TABLET | Refills: 1 | Status: SHIPPED | OUTPATIENT
Start: 2022-07-01 | End: 2023-02-02

## 2022-08-03 NOTE — PROGRESS NOTES
[facility]  [address]  [facility] [address] [phone]  [address]  [facility] [address] [phone]  Dept: [phone]  10/19/2017      Kymberly Edwards.  [id number]  [id number]  1970  female      Chief Complaint   Patient presents with   • Follow-up     F/U AGB       BH Post-Op Bariatric Surgery:   Kymberly Edwards is status post Lapband procedure (APS), performed on 6/30/14.     HPI:   Today's weight is 206 lb (93.4 kg)  pounds, today's BMI is Body mass index is 34.28 kg/(m^2). and she has a gain of 20 pounds since the last visit. The patient reports a portion size larger than the small plate, increased hunger and denies a loss of appetite.     Kymberly Edwards denies nausea, dysphagia, or abdominal pain. The patientdoes not have vomitng. The patientdoes not have reflux.    Diet and Exercise: Diet history reviewed and discussed with the patient. Weight loss/gains to date discussed with the patient. She reports eating 4 meals per day, a typical portion size of greater than 1 cup, eating 1 snack per day, drinking 1 8-oz. glasses of water per day. The patient can tolerate solid protein.   The patient is eating protein first. The patient is not limiting food volume. The patient is not taking vitamins. The patient is not limiting snacking. The patient is exercising regularly. She is eating sweets. .     The following portions of the patient's history were reviewed and updated as appropriate: allergies, current medications, past family history, past medical history, past social history, past surgical history and problem list.    Vitals:    10/19/17 1320   BP: 124/87   Pulse: 72   Resp: 16   Temp: 98.3 °F (36.8 °C)       Review of Systems   Constitutional: Positive for appetite change. Negative for fatigue and unexpected weight change.   HENT: Negative.    Eyes: Negative.    Respiratory: Negative.    Cardiovascular: Negative.   Negative for leg swelling.   Gastrointestinal: Negative for abdominal distention, abdominal pain, constipation, diarrhea, nausea and vomiting.   Genitourinary: Negative for difficulty urinating, frequency and urgency.   Musculoskeletal: Negative for back pain.   Skin: Negative.    Psychiatric/Behavioral: Negative.    All other systems reviewed and are negative.      Physical Exam   Constitutional: She appears well-developed and well-nourished.   Neck: No thyromegaly present.   Cardiovascular: Normal rate, regular rhythm and normal heart sounds.    Pulmonary/Chest: Effort normal and breath sounds normal. No respiratory distress. She has no wheezes.   Abdominal: Soft. Bowel sounds are normal. She exhibits no distension. There is no tenderness. There is no guarding. No hernia.   Musculoskeletal: She exhibits no edema or tenderness.   Neurological: She is alert.   Skin: Skin is warm and dry. No rash noted. No erythema.   Psychiatric: She has a normal mood and affect. Her behavior is normal.   Nursing note and vitals reviewed.      Assessment: Post-operatively the patient has regressed.    Encounter Diagnoses   Name Primary?   • Obesity, Class I, BMI 30-34.9 Yes   • Dietary counseling    • Exercise counseling    • Gastroesophageal reflux disease, esophagitis presence not specified    • Polyphagia        Plan:     I think she would benefit from additional band restriction.  Under aseptic conditions, I accessed the port and 0.4mls of fluid were added for a total of 5.4mls.  She was able to tolerate a glass of water at the conclusion of the fill.  She was advised to consume soft solids for 24 hours before advancing back to a regular diet.  RTC for n/v/d/regurg.     We discussed her protein sources and that eating dense solid protein along with plenty of vegetables and fresh fruits is important for weight loss. Reviewed appropriate water intake- half of body weight in ounces and exercise routine- minimum of 150 minutes per  with including both cardio and strength training. Instructed not to drink with meals and wait 45 minutes after each meal before drinking.    She should follow-up in 4-6 weeks       Activity restrictions: None.   Instructions / Recommendations: dietary counseling recommended, recommended a daily protein intake of  grams, patient was advised that the lap band system works best when consuming solid foods, vitamin supplement(s) recommended, recommended exercising at least 150 minutes per week, behavior modifications recommended and instructed to call the office for concerns, questions, or problems.          MOLECULAR PCR

## 2022-08-26 ENCOUNTER — OFFICE VISIT (OUTPATIENT)
Dept: FAMILY MEDICINE CLINIC | Facility: CLINIC | Age: 52
End: 2022-08-26

## 2022-08-26 VITALS
BODY MASS INDEX: 43.15 KG/M2 | RESPIRATION RATE: 16 BRPM | OXYGEN SATURATION: 100 % | TEMPERATURE: 97.7 F | SYSTOLIC BLOOD PRESSURE: 110 MMHG | DIASTOLIC BLOOD PRESSURE: 78 MMHG | HEART RATE: 89 BPM | HEIGHT: 65 IN | WEIGHT: 259 LBS

## 2022-08-26 DIAGNOSIS — K21.9 GASTROESOPHAGEAL REFLUX DISEASE WITHOUT ESOPHAGITIS: ICD-10-CM

## 2022-08-26 DIAGNOSIS — F33.42 RECURRENT MAJOR DEPRESSIVE DISORDER, IN FULL REMISSION: ICD-10-CM

## 2022-08-26 DIAGNOSIS — E55.9 VITAMIN D DEFICIENCY: ICD-10-CM

## 2022-08-26 DIAGNOSIS — F41.9 ANXIETY: Primary | ICD-10-CM

## 2022-08-26 DIAGNOSIS — R53.83 OTHER FATIGUE: ICD-10-CM

## 2022-08-26 DIAGNOSIS — E03.9 HYPOTHYROIDISM, UNSPECIFIED TYPE: ICD-10-CM

## 2022-08-26 DIAGNOSIS — F51.01 PRIMARY INSOMNIA: ICD-10-CM

## 2022-08-26 PROBLEM — G47.00 INSOMNIA: Status: RESOLVED | Noted: 2022-02-25 | Resolved: 2022-08-26

## 2022-08-26 PROBLEM — F41.8 ANXIETY WITH DEPRESSION: Status: RESOLVED | Noted: 2021-08-20 | Resolved: 2022-08-26

## 2022-08-26 PROBLEM — D50.0 IRON DEFICIENCY ANEMIA DUE TO CHRONIC BLOOD LOSS: Status: RESOLVED | Noted: 2021-01-19 | Resolved: 2022-08-26

## 2022-08-26 PROCEDURE — 99214 OFFICE O/P EST MOD 30 MIN: CPT | Performed by: FAMILY MEDICINE

## 2022-08-26 RX ORDER — OMEPRAZOLE 10 MG/1
CAPSULE, DELAYED RELEASE ORAL DAILY
COMMUNITY
End: 2022-08-26

## 2022-08-26 RX ORDER — OMEPRAZOLE 20 MG/1
20 CAPSULE, DELAYED RELEASE ORAL DAILY
Qty: 90 CAPSULE | Refills: 1 | Status: SHIPPED | OUTPATIENT
Start: 2022-08-26 | End: 2022-11-02

## 2022-08-26 RX ORDER — DOXYCYCLINE HYCLATE 50 MG/1
50 CAPSULE ORAL 2 TIMES DAILY
COMMUNITY
Start: 2022-08-22 | End: 2023-03-03

## 2022-08-26 RX ORDER — LEVOTHYROXINE SODIUM 0.05 MG/1
50 TABLET ORAL DAILY
Qty: 90 TABLET | Refills: 1 | Status: SHIPPED | OUTPATIENT
Start: 2022-08-26 | End: 2022-10-13

## 2022-08-26 RX ORDER — LANOLIN ALCOHOL/MO/W.PET/CERES
CREAM (GRAM) TOPICAL DAILY
COMMUNITY
End: 2023-02-04

## 2022-08-26 RX ORDER — ESCITALOPRAM OXALATE 20 MG/1
20 TABLET ORAL DAILY
Qty: 90 TABLET | Refills: 1 | Status: SHIPPED | OUTPATIENT
Start: 2022-08-26 | End: 2023-03-03 | Stop reason: SDUPTHER

## 2022-08-26 NOTE — PATIENT INSTRUCTIONS
Recommend low fat/low calorie diet and exercise greater than 150 minutes of cardio per week.    Continue current treatment plan.     Further recommendations based on labs

## 2022-08-26 NOTE — PROGRESS NOTES
"Chief Complaint  Hypothyroidism (6 month follow up.), anxiety and depression, and Insomnia     6-month follow-up for ERNESTO, depression, hypothyroidism, insomnia, GERD, fatigue and vitamin D deficiency     LOV with me in February for wellness exam, chronic med refills with labs, and uncontrolled insomnia and some fatigue  -- Screening test updated, referral for C-scope done.  She has plans to get her C-scope in November 2022 (appointment has been rescheduled by provider.)  -- Vistaril 25 mg p.o. nightly was added on an as-needed basis for insomnia, this has helped and takes as needed.  -- Prescription vitamin D was added due to some complaints of fatigue and vitamin D deficiency.  She believes she took the prescription, however is uncertain how long she took it??    Overall, doing better.  Sleeping much better.  Trying to maintain a healthier lifestyle.  She has successfully lost about 6 pounds in the last 3 to 4 months.  Still has some mild fatigue but overall much improved.  Recently seen her gynecologist, Pap smear done.  Labs were performed at the GYN office, all WNL.  Menopause x1 year.  Anxiety and depression have been well controlled.    No particular complaints or concerns today.  Needs 6-month refills, due for follow-up labs?  Compliant with and tolerates all medications without side effects.    Review of Systems   Constitutional: Negative for unexpected weight change.   Respiratory: Negative for shortness of breath.    Cardiovascular: Negative for chest pain.        Subjective          Kymberly Edwards presents to [facility]    Objective   Vital Signs:   Vitals:    08/26/22 1241   BP: 110/78   Pulse: 89   Resp: 16   Temp: 97.7 °F (36.5 °C)   SpO2: 100%   Weight: 117 kg (259 lb)   Height: 165.1 cm (65\")      Body mass index is 43.1 kg/m².   Physical Exam  Vitals and nursing note reviewed.   Constitutional:       Appearance: Normal appearance. She is well-developed.   HENT:      Head: " Normocephalic and atraumatic.      Nose: Nose normal.   Eyes:      Conjunctiva/sclera: Conjunctivae normal.      Pupils: Pupils are equal, round, and reactive to light.   Neck:      Thyroid: No thyromegaly.   Cardiovascular:      Rate and Rhythm: Normal rate and regular rhythm.      Heart sounds: Normal heart sounds. No murmur heard.  Pulmonary:      Effort: Pulmonary effort is normal.      Breath sounds: Normal breath sounds.   Abdominal:      General: Abdomen is flat. Bowel sounds are normal. There is no distension.      Palpations: Abdomen is soft. There is no hepatomegaly, splenomegaly or mass.      Tenderness: There is no abdominal tenderness. There is no guarding or rebound.      Hernia: No hernia is present.   Musculoskeletal:         General: Normal range of motion.      Cervical back: Normal range of motion and neck supple.      Right lower leg: No edema.      Left lower leg: No edema.   Lymphadenopathy:      Cervical: No cervical adenopathy.   Skin:     General: Skin is warm.   Neurological:      General: No focal deficit present.      Mental Status: She is alert.   Psychiatric:         Mood and Affect: Mood normal.         Behavior: Behavior normal.         Thought Content: Thought content normal.         Judgment: Judgment normal.        Result Review :     Common labs    Common Labsle 5/18/22 5/18/22    1058 1058   Total Cholesterol  204 (A)   Triglycerides  107   HDL Cholesterol  56   LDL Cholesterol   129 (A)   Hemoglobin A1C 5.5    (A) Abnormal value       Comments are available for some flowsheets but are not being displayed.           TSH    TSH 5/18/22   TSH 2.720           A1C Last 3 Results    HGBA1C Last 3 Results 5/18/22   Hemoglobin A1C 5.5      Comments are available for some flowsheets but are not being displayed.               Lab Results   Component Value Date    FERRITIN 6 (L) 10/19/2020    CIWF06MA 23.9 (L) 08/20/2021    FOLATE >20.0 08/01/2014 August 2021 --CBC and BMP  normal           Assessment and Plan    Diagnoses and all orders for this visit:    1. Anxiety (Primary)  -controlled, continue Lexapro 20 mg daily    2. Recurrent major depressive disorder, in full remission (HCC)  -controlled  Continue/refill Lexapro 20 mg daily    3. Primary insomnia  -controlled  May continue melatonin or Vistaril on an as-needed basis for insomnia    4. Hypothyroidism, unspecified type  -controlled  TSH up-to-date and therapeutic.  Continue/refill Synthroid 50 mcg 1 p.o. every morning    5. Gastroesophageal reflux disease without esophagitis  -controlled  Continue/refill omeprazole 20 mg 1 p.o. daily  Status post EGD in past    6. Vitamin D deficiency  -controlled?  Due to recheck vitamin D level, further recommendations to follow.  -     Vitamin D 1,25 Dihydroxy    7. Other fatigue  -mild.  Suspect there may be some vitamin D deficiency?  Recheck vitamin D, B12, CBC and BMP  Weight loss needed and increased exercise ---Needs low-carb/low calorie/low cholesterol diet and increase cardio exercise to greater than 150 minutes weekly  -     Vitamin B12 & Folate  -     CBC & Differential  -     Basic Metabolic Panel    Other orders  -     escitalopram (LEXAPRO) 20 MG tablet; Take 1 tablet by mouth Daily.  Dispense: 90 tablet; Refill: 1  -     levothyroxine (SYNTHROID, LEVOTHROID) 50 MCG tablet; Take 1 tablet by mouth Daily.  Dispense: 90 tablet; Refill: 1  -     omeprazole (priLOSEC) 20 MG capsule; Take 1 capsule by mouth Daily.  Dispense: 90 capsule; Refill: 1    If all labs WNL and doing well then may follow-up in 6 months for annual wellness        Follow Up   Return in about 6 months (around 2/26/2023) for Annual physical.  Patient was given instructions and counseling regarding her condition or for health maintenance advice. Please see specific information pulled into the AVS if appropriate.

## 2022-08-30 LAB
1,25(OH)2D SERPL-MCNC: 44.9 PG/ML (ref 24.8–81.5)
BASOPHILS # BLD AUTO: 0.1 X10E3/UL (ref 0–0.2)
BASOPHILS NFR BLD AUTO: 1 %
BUN SERPL-MCNC: 10 MG/DL (ref 6–24)
BUN/CREAT SERPL: 11 (ref 9–23)
CALCIUM SERPL-MCNC: 9.5 MG/DL (ref 8.7–10.2)
CHLORIDE SERPL-SCNC: 106 MMOL/L (ref 96–106)
CO2 SERPL-SCNC: 21 MMOL/L (ref 20–29)
CREAT SERPL-MCNC: 0.92 MG/DL (ref 0.57–1)
EGFRCR-CYS SERPLBLD CKD-EPI 2021: 75 ML/MIN/1.73
EOSINOPHIL # BLD AUTO: 0.1 X10E3/UL (ref 0–0.4)
EOSINOPHIL NFR BLD AUTO: 2 %
ERYTHROCYTE [DISTWIDTH] IN BLOOD BY AUTOMATED COUNT: 13.5 % (ref 11.7–15.4)
FOLATE SERPL-MCNC: >20 NG/ML
GLUCOSE SERPL-MCNC: 81 MG/DL (ref 65–99)
HCT VFR BLD AUTO: 41.2 % (ref 34–46.6)
HGB BLD-MCNC: 13.7 G/DL (ref 11.1–15.9)
IMM GRANULOCYTES # BLD AUTO: 0 X10E3/UL (ref 0–0.1)
IMM GRANULOCYTES NFR BLD AUTO: 0 %
LYMPHOCYTES # BLD AUTO: 1.4 X10E3/UL (ref 0.7–3.1)
LYMPHOCYTES NFR BLD AUTO: 27 %
MCH RBC QN AUTO: 29 PG (ref 26.6–33)
MCHC RBC AUTO-ENTMCNC: 33.3 G/DL (ref 31.5–35.7)
MCV RBC AUTO: 87 FL (ref 79–97)
MONOCYTES # BLD AUTO: 0.3 X10E3/UL (ref 0.1–0.9)
MONOCYTES NFR BLD AUTO: 6 %
NEUTROPHILS # BLD AUTO: 3.3 X10E3/UL (ref 1.4–7)
NEUTROPHILS NFR BLD AUTO: 64 %
PLATELET # BLD AUTO: 267 X10E3/UL (ref 150–450)
POTASSIUM SERPL-SCNC: 4.2 MMOL/L (ref 3.5–5.2)
RBC # BLD AUTO: 4.73 X10E6/UL (ref 3.77–5.28)
SODIUM SERPL-SCNC: 143 MMOL/L (ref 134–144)
VIT B12 SERPL-MCNC: 477 PG/ML (ref 232–1245)
WBC # BLD AUTO: 5.1 X10E3/UL (ref 3.4–10.8)

## 2022-09-12 ENCOUNTER — OFFICE VISIT (OUTPATIENT)
Dept: BARIATRICS/WEIGHT MGMT | Facility: CLINIC | Age: 52
End: 2022-09-12

## 2022-09-12 VITALS
BODY MASS INDEX: 41.46 KG/M2 | HEIGHT: 66 IN | RESPIRATION RATE: 18 BRPM | SYSTOLIC BLOOD PRESSURE: 145 MMHG | DIASTOLIC BLOOD PRESSURE: 101 MMHG | HEART RATE: 67 BPM | WEIGHT: 258 LBS | TEMPERATURE: 97.3 F

## 2022-09-12 DIAGNOSIS — K21.9 GASTROESOPHAGEAL REFLUX DISEASE WITHOUT ESOPHAGITIS: ICD-10-CM

## 2022-09-12 DIAGNOSIS — Z71.3 DIETARY COUNSELING: ICD-10-CM

## 2022-09-12 DIAGNOSIS — Z98.84 STATUS FOLLOWING GASTRIC BANDING SURGERY FOR WEIGHT LOSS: ICD-10-CM

## 2022-09-12 DIAGNOSIS — E66.01 OBESITY, CLASS III, BMI 40-49.9 (MORBID OBESITY): Primary | ICD-10-CM

## 2022-09-12 DIAGNOSIS — R11.10 REGURGITATION OF FOOD: ICD-10-CM

## 2022-09-12 PROCEDURE — 99213 OFFICE O/P EST LOW 20 MIN: CPT | Performed by: NURSE PRACTITIONER

## 2022-09-12 NOTE — PROGRESS NOTES
[facility] SURGERY  [address] [address]  [phone]  [id number] [address]  [phone]  Dept: [phone]  9/12/2022      Kymberly Edwards.  [id number]  [id number]  1970  female      Chief Complaint   Patient presents with   • Lap Band Adjustment     Band follow up (2.8mL)       BH Post-Op Bariatric Surgery:   Kymberly Edwards is status post Lapband procedure APS, performed on 6/30/2014.     HPI:   Today's weight is 117 kg (258 lb)  pounds, today's BMI is Body mass index is 42.26 kg/m². and@ has a gain of 2 pounds since the last visit. The patient reports decreased hunger and  loss of appetite.     Patient admits to nausea and dysphagia. The patient denies abdominal pain. The patientdoes have vomitng. The patientdoes have reflux.  Reviewed UGI from 2017 and 2020 which both show esophageal dilation above the band but good position of the band.    Diet and Exercise: Diet history reviewed and discussed with the patient. Weight loss/gains to date discussed with the patient. She reports eating 4-5 meals per day, a typical portion size of 1 cup, eating 1-2 snack per day, drinking 5 8-oz. glasses of water per day. The patient cannot tolerate solid protein.   The patient is not eating protein first. The patient is limiting food volume. The patient is taking vitamins. The patient is limiting snacking. The patient is not exercising regularly. She is not drinking carbonated beverages.     The following portions of the patient's history were reviewed and updated as appropriate: allergies, current medications, past medical history, past surgical history and problem list.    Vitals:    09/12/22 1625   BP: (!) 145/101   Pulse: 67   Resp: 18   Temp: 97.3 °F (36.3 °C)       Review of Systems   Constitutional: Positive for activity change, appetite change and fatigue.   Gastrointestinal: Positive for nausea and vomiting.         GERD   All other systems reviewed and are negative.      Physical Exam  Vitals reviewed.   Constitutional:       General: She is not in acute distress.     Appearance: Normal appearance. She is morbidly obese.   HENT:      Head: Normocephalic and atraumatic.      Mouth/Throat:      Mouth: Mucous membranes are moist.      Pharynx: Oropharynx is clear.   Eyes:      General: No scleral icterus.     Extraocular Movements: Extraocular movements intact.      Conjunctiva/sclera: Conjunctivae normal.      Pupils: Pupils are equal, round, and reactive to light.   Cardiovascular:      Rate and Rhythm: Normal rate and regular rhythm.      Heart sounds: Normal heart sounds. No murmur heard.    No gallop.   Pulmonary:      Effort: Pulmonary effort is normal. No respiratory distress.   Abdominal:      General: Bowel sounds are normal.      Palpations: Abdomen is soft.   Musculoskeletal:         General: Normal range of motion.      Cervical back: Normal range of motion and neck supple.   Skin:     General: Skin is warm and dry.   Neurological:      General: No focal deficit present.      Mental Status: She is alert and oriented to person, place, and time.   Psychiatric:         Mood and Affect: Mood normal.         Behavior: Behavior normal.         Thought Content: Thought content normal.         Judgment: Judgment normal.         Assessment: Post-operatively the patient is struggling with po intake.  She is unable to tolerated protein including meats.      Encounter Diagnoses   Name Primary?   • Obesity, Class III, BMI 40-49.9 (morbid obesity) ([facility]) Yes   • Status following gastric banding surgery for weight loss    • Regurgitation of food    • Gastroesophageal reflux disease without esophagitis    • Dietary counseling        Plan:    My impression is Kymberly Edwards has too much restriction of her band.  I think she would benefit from fluid removal from her band.  Under aseptic conditions, I accessed the port and removed 2.8cc to  bring the total band volume to 0cc.  She was able to tolerate a glass of water at the conclusion of the fill.  She was advised to consume warm liquids for today and then soft solids for 24 hours before advancing back to a regular diet.   RTC for n/v/d/regurg.     Reviewed the importance of being able to tolerate dense/solid protein and vegetables for weight loss. Discussed eating foods that are easier to tolerate, softer foods, usually contribute to weight gain because they are higher calorie/carb and will not keep patient full for the recommended 3-4 hours.      She should follow-up if symptoms do not improve.      UGI will be ordered    Activity restrictions: None.   Instructions / Recommendations: dietary counseling recommended, recommended a daily protein intake of  grams, patient was advised that the lap band system works best when consuming solid foods, vitamin supplement(s) recommended, recommended exercising at least 150 minutes per week, behavior modifications recommended and instructed to call the office for concerns, questions, or problems.     Total time adjusting the band and time with the patient was approximately 25 minutes.  Chart was also reviewed prior to starting the visit. The patient was counseled regarding the LAP-BAND and how the band works.

## 2022-09-21 ENCOUNTER — HOSPITAL ENCOUNTER (OUTPATIENT)
Dept: GENERAL RADIOLOGY | Facility: HOSPITAL | Age: 52
Discharge: HOME | End: 2022-09-21
Admitting: NURSE PRACTITIONER

## 2022-09-21 DIAGNOSIS — E66.01 OBESITY, CLASS III, BMI 40-49.9 (MORBID OBESITY): ICD-10-CM

## 2022-09-21 DIAGNOSIS — R11.10 REGURGITATION OF FOOD: ICD-10-CM

## 2022-09-21 DIAGNOSIS — K21.9 GASTROESOPHAGEAL REFLUX DISEASE WITHOUT ESOPHAGITIS: ICD-10-CM

## 2022-09-21 DIAGNOSIS — Z71.3 DIETARY COUNSELING: ICD-10-CM

## 2022-09-21 DIAGNOSIS — Z98.84 STATUS FOLLOWING GASTRIC BANDING SURGERY FOR WEIGHT LOSS: ICD-10-CM

## 2022-09-21 PROCEDURE — 74240 X-RAY XM UPR GI TRC 1CNTRST: CPT

## 2022-09-21 RX ADMIN — BARIUM SULFATE 60 ML: 960 POWDER, FOR SUSPENSION ORAL at 07:40

## 2022-10-13 RX ORDER — LEVOTHYROXINE SODIUM 0.05 MG/1
TABLET ORAL
Qty: 90 TABLET | Refills: 1 | Status: SHIPPED | OUTPATIENT
Start: 2022-10-13 | End: 2023-03-03 | Stop reason: SDUPTHER

## 2022-11-02 RX ORDER — OMEPRAZOLE 20 MG/1
CAPSULE, DELAYED RELEASE ORAL
Qty: 90 CAPSULE | Refills: 1 | Status: SHIPPED | OUTPATIENT
Start: 2022-11-02 | End: 2023-03-03 | Stop reason: SDUPTHER

## 2022-11-11 ENCOUNTER — HOSPITAL ENCOUNTER (OUTPATIENT)
Facility: HOSPITAL | Age: 52
Setting detail: HOSPITAL OUTPATIENT SURGERY
Discharge: HOME | End: 2022-11-11
Attending: INTERNAL MEDICINE | Admitting: INTERNAL MEDICINE

## 2022-11-11 ENCOUNTER — ANESTHESIA EVENT (OUTPATIENT)
Dept: GASTROENTEROLOGY | Facility: HOSPITAL | Age: 52
End: 2022-11-11

## 2022-11-11 ENCOUNTER — ANESTHESIA (OUTPATIENT)
Dept: GASTROENTEROLOGY | Facility: HOSPITAL | Age: 52
End: 2022-11-11

## 2022-11-11 VITALS
TEMPERATURE: 97.8 F | OXYGEN SATURATION: 100 % | HEIGHT: 65 IN | RESPIRATION RATE: 16 BRPM | SYSTOLIC BLOOD PRESSURE: 144 MMHG | WEIGHT: 252 LBS | BODY MASS INDEX: 41.99 KG/M2 | DIASTOLIC BLOOD PRESSURE: 76 MMHG | HEART RATE: 62 BPM

## 2022-11-11 DIAGNOSIS — Z12.12 SCREENING FOR COLORECTAL CANCER: ICD-10-CM

## 2022-11-11 DIAGNOSIS — Z12.11 SCREENING FOR COLORECTAL CANCER: ICD-10-CM

## 2022-11-11 PROCEDURE — 45378 DIAGNOSTIC COLONOSCOPY: CPT | Performed by: INTERNAL MEDICINE

## 2022-11-11 PROCEDURE — 25010000002 PROPOFOL 10 MG/ML EMULSION: Performed by: ANESTHESIOLOGY

## 2022-11-11 PROCEDURE — 0 LIDOCAINE 1 % SOLUTION: Performed by: ANESTHESIOLOGY

## 2022-11-11 PROCEDURE — S0260 H&P FOR SURGERY: HCPCS | Performed by: INTERNAL MEDICINE

## 2022-11-11 RX ORDER — SODIUM CHLORIDE 0.9 % (FLUSH) 0.9 %
10 SYRINGE (ML) INJECTION AS NEEDED
Status: DISCONTINUED | OUTPATIENT
Start: 2022-11-11 | End: 2022-11-11 | Stop reason: HOSPADM

## 2022-11-11 RX ORDER — SODIUM CHLORIDE 0.9 % (FLUSH) 0.9 %
10 SYRINGE (ML) INJECTION EVERY 12 HOURS SCHEDULED
Status: DISCONTINUED | OUTPATIENT
Start: 2022-11-11 | End: 2022-11-11 | Stop reason: HOSPADM

## 2022-11-11 RX ORDER — PROPOFOL 10 MG/ML
VIAL (ML) INTRAVENOUS AS NEEDED
Status: DISCONTINUED | OUTPATIENT
Start: 2022-11-11 | End: 2022-11-11 | Stop reason: SURG

## 2022-11-11 RX ORDER — LIDOCAINE HYDROCHLORIDE 10 MG/ML
INJECTION, SOLUTION INFILTRATION; PERINEURAL AS NEEDED
Status: DISCONTINUED | OUTPATIENT
Start: 2022-11-11 | End: 2022-11-11 | Stop reason: SURG

## 2022-11-11 RX ORDER — SODIUM CHLORIDE, SODIUM LACTATE, POTASSIUM CHLORIDE, CALCIUM CHLORIDE 600; 310; 30; 20 MG/100ML; MG/100ML; MG/100ML; MG/100ML
30 INJECTION, SOLUTION INTRAVENOUS CONTINUOUS PRN
Status: DISCONTINUED | OUTPATIENT
Start: 2022-11-11 | End: 2022-11-11 | Stop reason: HOSPADM

## 2022-11-11 RX ADMIN — PROPOFOL 50 MG: 10 INJECTION, EMULSION INTRAVENOUS at 08:55

## 2022-11-11 RX ADMIN — PROPOFOL 140 MCG/KG/MIN: 10 INJECTION, EMULSION INTRAVENOUS at 08:50

## 2022-11-11 RX ADMIN — PROPOFOL 100 MG: 10 INJECTION, EMULSION INTRAVENOUS at 08:50

## 2022-11-11 RX ADMIN — SODIUM CHLORIDE, POTASSIUM CHLORIDE, SODIUM LACTATE AND CALCIUM CHLORIDE 30 ML/HR: 600; 310; 30; 20 INJECTION, SOLUTION INTRAVENOUS at 08:44

## 2022-11-11 RX ADMIN — LIDOCAINE HYDROCHLORIDE 30 MG: 10 INJECTION, SOLUTION INFILTRATION; PERINEURAL at 08:50

## 2022-11-11 NOTE — H&P
"[facility]  Pre Procedure History & Physical    Chief Complaint:   Screening for colorectal cancer    Subjective     HPI:   This 52-year-old female presents the endoscopy suite for colonoscopic examination.  This is a screening assessment for colorectal cancer.  No prior colonoscopy of record.    Past Medical History:   Past Medical History:   Diagnosis Date   • Anemia  or    • Anxiety     Work related.   • Benign essential hypertension     RESOLVED. No longer on medication.   • Constipation     Intermittent.     • Depression Dec 2020    Multiple deaths in family over short period   • Disease of thyroid gland     Not sure when diagnosed   • Gastroesophageal reflux disease 10/07/2016    OTC Prilosec.  Secondary to GERD after lap band surgery.   • Hemorrhoids     Intermittent.   • Hypothyroidism    • Irritable bowel syndrome     Not currently a problem. Resolved after Bariatric surgery.   • Kidney stone     Passed stone on her own.   • Obesity    • Preeclampsia    • Regurgitation     GERD after lap band surgery.   • Varicella     As a child   • Vitamin D deficiency 2016       Past Surgical History:  Past Surgical History:   Procedure Laterality Date   • BARIATRIC SURGERY  14   •  SECTION  2000    x2    \"ROGER\" ;      \"NATHANIEL\"  First one secondary to PIH, severe HTN, Labored, failed to progress. Next had a repeat C/S.   •  SECTION WITH TUBAL     • CHOLECYSTECTOMY      Stones,Dr Danilo Cobos   • GASTRIC SLEEVE LAPAROSCOPIC   or     Lap band   • LAPAROSCOPIC CHOLECYSTECTOMY      Not sure what year   • LAPAROSCOPIC GASTRIC BANDING      As of Oct, 2018, has lost 80# !!   • POSTPARTUM TUBAL LIGATION      At the time of her repeat .   • TUBAL ABDOMINAL LIGATION     • WISDOM TOOTH EXTRACTION      Age 19 or 20       Family History:  Family History   Problem Relation Age of Onset   • Heart failure Father "    • Kidney failure Father    • Diabetes Father    • Hypertension Father    • Kidney disease Father    • Stroke Father    • Aortic aneurysm Mother         Small, no surgery.   • No Known Problems Brother    • No Known Problems Sister    • No Known Problems Sister    • No Known Problems Son    • No Known Problems Daughter    • Heart attack Paternal Grandfather    • Diabetes Paternal Grandmother    • Ulcerative colitis Maternal Grandmother          this year, bone cancer   • Heart attack Maternal Grandfather 49   • Breast cancer Maternal Aunt 48        The patient's genetic myRisk test is negative.   • Prostate cancer Maternal Uncle 60       Social History:   reports that she has never smoked. She has never used smokeless tobacco. She reports that she does not drink alcohol and does not use drugs.    Medications:   Medications Prior to Admission   Medication Sig Dispense Refill Last Dose   • doxycycline (VIBRAMYCIN) 50 MG capsule Take 50 mg by mouth 2 (Two) Times a Day.      • escitalopram (LEXAPRO) 20 MG tablet Take 1 tablet by mouth Daily. 90 tablet 1    • hydrOXYzine (ATARAX) 25 MG tablet TAKE 1 TABLET BY MOUTH EVERYDAY AT BEDTIME (Patient taking differently: As Needed. TAKE 1 TABLET BY MOUTH EVERYDAY AT BEDTIME) 90 tablet 1    • levothyroxine (SYNTHROID, LEVOTHROID) 50 MCG tablet TAKE 1 TABLET BY MOUTH EVERY DAY 90 tablet 1    • melatonin 3 MG tablet Daily.      • multivitamin with minerals tablet tablet Take 1 tablet by mouth Daily.      • omeprazole (priLOSEC) 20 MG capsule TAKE 1 CAPSULE BY MOUTH EVERY DAY 90 capsule 1    • vitamin D (ERGOCALCIFEROL) 1.25 MG (40877 UT) capsule capsule Take 1 capsule by mouth 1 (One) Time Per Week. 4 capsule 3        Allergies:  Klaron [sulfacetamide sodium (acne)] and Morphine    ROS:    Pertinent items are noted in HPI, all other systems reviewed and negative     Objective     not currently breastfeeding.    Physical Exam   Constitutional: Pt is oriented to person, place,  and time and well-developed, well-nourished, and in no distress.   Mouth/Throat: Oropharynx is clear and moist.   Neck: Normal range of motion.   Cardiovascular: Normal rate, regular rhythm and normal heart sounds.    Pulmonary/Chest: Effort normal and breath sounds normal.   Abdominal: Soft. Nontender  Skin: Skin is warm and dry.   Psychiatric: Mood, memory, affect and judgment normal.     Assessment & Plan     Diagnosis:  Screening for colorectal cancer    Anticipated Surgical Procedure:  Colonoscopy    The risks, benefits, and alternatives of this procedure have been discussed with the patient or the responsible party- the patient understands and agrees to proceed.

## 2022-11-11 NOTE — ANESTHESIA PREPROCEDURE EVALUATION
Anesthesia Evaluation     Patient summary reviewed and Nursing notes reviewed   NPO Solid Status: > 8 hours  NPO Liquid Status: > 2 hours           Airway   Mallampati: II  TM distance: >3 FB  Neck ROM: full  No difficulty expected  Dental - normal exam     Pulmonary - normal exam   (+) sleep apnea,   Cardiovascular - normal exam    (+) hypertension,       Neuro/Psych  (+) psychiatric history Anxiety and Depression,    GI/Hepatic/Renal/Endo    (+) morbid obesity, GERD,  thyroid problem hypothyroidism    Musculoskeletal (-) negative ROS    Abdominal    Substance History - negative use     OB/GYN negative ob/gyn ROS         Other                        Anesthesia Plan    ASA 2     MAC     intravenous induction     Anesthetic plan, risks, benefits, and alternatives have been provided, discussed and informed consent has been obtained with: patient.        CODE STATUS:

## 2022-11-11 NOTE — DISCHARGE INSTRUCTIONS
For the next 24 hours patient needs to be with a responsible adult.    For 24 hours DO NOT drive, operate machinery, appliances, drink alcohol, make important decisions or sign legal documents.    Start with a light or bland diet if you are feeling sick to your stomach otherwise advance to regular diet as tolerated.    Follow recommendations on procedure report if provided by your doctor.    Call Dr. Bond for problems [phone].    Problems may include but not limited to: large amounts of bleeding, trouble breathing, repeated vomiting, severe unrelieved pain, fever or chills.

## 2022-11-11 NOTE — ANESTHESIA POSTPROCEDURE EVALUATION
Patient: Kymberly Edwards    Procedure Summary     Date: 11/11/22 Room / Location:  [facility]    Anesthesia Start: 0847 Anesthesia Stop: 0911    Procedure: COLONOSCOPY to cecum and t.i. Diagnosis:       Screening for colorectal cancer      (Screening for colorectal cancer [Z12.11, Z12.12])    Surgeons: Jay Bond MD Provider: Deshawn Burger MD    Anesthesia Type: MAC ASA Status: 2          Anesthesia Type: MAC    Vitals  Vitals Value Taken Time   /82 11/11/22 0908   Temp     Pulse 65 11/11/22 0908   Resp 17 11/11/22 0908   SpO2 96 % 11/11/22 0908           Post Anesthesia Care and Evaluation    Patient location during evaluation: bedside  Patient participation: complete - patient participated  Level of consciousness: awake and alert  Pain management: adequate    Airway patency: patent  Anesthetic complications: No anesthetic complications  PONV Status: controlled  Cardiovascular status: acceptable  Respiratory status: acceptable  Hydration status: acceptable

## 2023-02-02 NOTE — TELEPHONE ENCOUNTER
Rx Refill Note  Requested Prescriptions     Pending Prescriptions Disp Refills   • hydrOXYzine (ATARAX) 25 MG tablet [Pharmacy Med Name: HYDROXYZINE HCL 25 MG TABLET] 90 tablet 1     Sig: TAKE 1 TABLET BY MOUTH EVERYNIGHT AT BEDTIME      Last office visit with prescribing clinician: 8/26/2022   Last telemedicine visit with prescribing clinician: 3/3/2023   Next office visit with prescribing clinician: 3/3/2023                         Would you like a call back once the refill request has been completed: [] Yes [] No    If the office needs to give you a call back, can they leave a voicemail: [] Yes [] No    Halley Sanchez MA/LMR  02/02/23, 13:28 EST

## 2023-02-04 RX ORDER — HYDROXYZINE HYDROCHLORIDE 25 MG/1
TABLET, FILM COATED ORAL
Qty: 90 TABLET | Refills: 1 | Status: SHIPPED | OUTPATIENT
Start: 2023-02-04 | End: 2023-03-03

## 2023-03-03 ENCOUNTER — OFFICE VISIT (OUTPATIENT)
Dept: FAMILY MEDICINE CLINIC | Facility: CLINIC | Age: 53
End: 2023-03-03
Payer: COMMERCIAL

## 2023-03-03 VITALS
BODY MASS INDEX: 42.38 KG/M2 | HEIGHT: 65 IN | DIASTOLIC BLOOD PRESSURE: 70 MMHG | WEIGHT: 254.4 LBS | TEMPERATURE: 97.5 F | OXYGEN SATURATION: 98 % | HEART RATE: 60 BPM | SYSTOLIC BLOOD PRESSURE: 110 MMHG

## 2023-03-03 DIAGNOSIS — E03.9 HYPOTHYROIDISM, UNSPECIFIED TYPE: ICD-10-CM

## 2023-03-03 DIAGNOSIS — F33.42 RECURRENT MAJOR DEPRESSIVE DISORDER, IN FULL REMISSION: ICD-10-CM

## 2023-03-03 DIAGNOSIS — E66.01 OBESITY, CLASS III, BMI 40-49.9 (MORBID OBESITY): ICD-10-CM

## 2023-03-03 DIAGNOSIS — Z00.00 WELLNESS EXAMINATION: Primary | ICD-10-CM

## 2023-03-03 DIAGNOSIS — K21.9 GASTROESOPHAGEAL REFLUX DISEASE WITHOUT ESOPHAGITIS: ICD-10-CM

## 2023-03-03 DIAGNOSIS — E61.1 IRON DEFICIENCY: ICD-10-CM

## 2023-03-03 PROBLEM — R53.82 CHRONIC FATIGUE: Status: RESOLVED | Noted: 2020-04-17 | Resolved: 2023-03-03

## 2023-03-03 PROBLEM — F51.01 PRIMARY INSOMNIA: Status: RESOLVED | Noted: 2021-01-19 | Resolved: 2023-03-03

## 2023-03-03 PROBLEM — Z82.3 FAMILY HISTORY OF CEREBROVASCULAR ACCIDENT (CVA) IN FATHER: Status: ACTIVE | Noted: 2023-03-03

## 2023-03-03 PROBLEM — Z82.49 FAMILY HISTORY OF CHF (CONGESTIVE HEART FAILURE): Status: ACTIVE | Noted: 2023-03-03

## 2023-03-03 PROCEDURE — 99396 PREV VISIT EST AGE 40-64: CPT | Performed by: FAMILY MEDICINE

## 2023-03-03 RX ORDER — OMEPRAZOLE 20 MG/1
20 CAPSULE, DELAYED RELEASE ORAL DAILY
Qty: 90 CAPSULE | Refills: 3 | Status: SHIPPED | OUTPATIENT
Start: 2023-03-03

## 2023-03-03 RX ORDER — ESCITALOPRAM OXALATE 20 MG/1
20 TABLET ORAL DAILY
Qty: 90 TABLET | Refills: 3 | Status: SHIPPED | OUTPATIENT
Start: 2023-03-03

## 2023-03-03 RX ORDER — LEVOTHYROXINE SODIUM 0.05 MG/1
50 TABLET ORAL DAILY
Qty: 90 TABLET | Refills: 3 | Status: SHIPPED | OUTPATIENT
Start: 2023-03-03

## 2023-03-03 NOTE — PROGRESS NOTES
Chief Complaint  Annual Exam (Yearly wellness patient is fasting last mammogram 5/2022 has GYN for female wellness and mammogram already scheduled had Colonoscopy 11/2022), Anxiety, and Hypothyroidism     NEEDS ANNUAL WELLNESS EXAM  And  6-month follow-up for ERNESTO/depression, hypothyroidism, GERD, and chronic fatigue    LOV with me in August 2022 for chronic med refills with labs and complaints of chronic fatigue  -- Basic screening labs for fatigue done, WNL.  She was asked to improve upon exercise, better sleep, and vitamins.    Overall, doing much better.  Excited about first kurtisby, due in June 2023  Trying to do better with a healthier diet and more active lifestyle, she has successfully lost about 5 pounds in the last 6 months.  Sleep has improved, no chronic fatigue.  Rare snoring.  Mood good.  Maintains regular follow-up with gastric surgeon, GYN, and dermatologist.    No new complaints or concerns today.  Needs chronic med refills, 90-day supply from mail order service.  She is fasting, due for yearly lipids and thyroid check  Compliant with and tolerates all medications without side effects.    Routine health maintenance/screening test:  PAP --- has gynecologist, up-to-date  MAMMO --- May 2022, negative  DEXA --no screening done yet, per gynecologist  Colorectal Screen --- C-scope done November 2022, normal, repeat 10 years  Vaccines --- due for COVID booster, influenza, shingles vaccine  Smoking/ETOH Status --- non-smoker.  No alcohol use  Dentist, Eye Exam, Derm --- maintains regular dental visits, eye exams, and has a dermatologist  Diet/Exercise --- tries to maintain a healthy low-cholesterol diet, exercise is sporadic at best  Pertinent FH --- negative for colon cancer, premature CAD, breast cancer    Review of Systems   Constitutional: Negative for fever and unexpected weight change.   Respiratory: Negative for cough and shortness of breath.    Cardiovascular: Negative for chest pain.     "    Subjective          Kymberly Edwards presents to [facility]    Objective   Vital Signs:   Vitals:    03/03/23 1328   BP: 110/70   BP Location: Left arm   Patient Position: Sitting   Cuff Size: Large Adult   Pulse: 60   Temp: 97.5 °F (36.4 °C)   SpO2: 98%   Weight: 115 kg (254 lb 6.4 oz)   Height: 165.1 cm (65\")      Body mass index is 42.33 kg/m².   Physical Exam  Vitals and nursing note reviewed.   Constitutional:       Appearance: Normal appearance. She is well-developed.   HENT:      Head: Normocephalic and atraumatic.      Nose: Nose normal.   Eyes:      Conjunctiva/sclera: Conjunctivae normal.      Pupils: Pupils are equal, round, and reactive to light.   Neck:      Thyroid: No thyromegaly.   Cardiovascular:      Rate and Rhythm: Normal rate and regular rhythm.      Heart sounds: Normal heart sounds. No murmur heard.  Pulmonary:      Effort: Pulmonary effort is normal.      Breath sounds: Normal breath sounds.   Abdominal:      General: Abdomen is flat. Bowel sounds are normal. There is no distension.      Palpations: Abdomen is soft. There is no hepatomegaly, splenomegaly or mass.      Tenderness: There is no abdominal tenderness. There is no guarding or rebound.      Hernia: No hernia is present.   Musculoskeletal:         General: Normal range of motion.      Cervical back: Normal range of motion and neck supple.      Right lower leg: No edema.      Left lower leg: No edema.   Lymphadenopathy:      Cervical: No cervical adenopathy.   Skin:     General: Skin is warm.   Neurological:      General: No focal deficit present.      Mental Status: She is alert.   Psychiatric:         Mood and Affect: Mood normal.         Behavior: Behavior normal.         Thought Content: Thought content normal.         Judgment: Judgment normal.        Result Review :     Common labs    Common Labs 5/18/22 5/18/22 8/26/22 8/26/22    1058 1058 1330 1330   Glucose    81   BUN    10   Creatinine    0.92 "   Sodium    143   Potassium    4.2   Chloride    106   Calcium    9.5   WBC   5.1    Hemoglobin   13.7    Hematocrit   41.2    Platelets   267    Total Cholesterol  204 (A)     Triglycerides  107     HDL Cholesterol  56     LDL Cholesterol   129 (A)     Hemoglobin A1C 5.5      (A) Abnormal value       Comments are available for some flowsheets but are not being displayed.           TSH    TSH 5/18/22   TSH 2.720           A1C Last 3 Results    HGBA1C Last 3 Results 5/18/22   Hemoglobin A1C 5.5      Comments are available for some flowsheets but are not being displayed.               Lab Results   Component Value Date    FERRITIN 6 (L) 10/19/2020    [id number] 23.9 (L) 08/20/2021    FOLATE >20.0 08/26/2022 August 2022 --vitamin D and vitamin B12 normal, normal CBC, normal CMP         Assessment and Plan    Diagnoses and all orders for this visit:    1. Wellness examination (Primary)  -within normal limits except for BMI, 42.3  Has gynecologist, screening up-to-date.  All screening up-to-date including colonoscopy, needs lipid profile, influenza vaccine, shingles vaccine, and COVID booster (plans to obtain from pharmacy in near future.)  Otherwise, weight loss needed --Needs low-carb/low calorie/low cholesterol diet and increase cardio exercise to greater than 150 minutes weekly  -     Lipid Panel With LDL / HDL Ratio    2. Obesity, Class III, BMI 40-49.9 (morbid obesity) (HCC)  -BMI 42.33  Status post lap banding, per gastric surgeon  Stressed the importance of improving upon a low-cholesterol/low-carb diet and increasing cardio exercise to greater than 150 minutes weekly.    3. Hypothyroidism, unspecified type  -appears controlled  Due to recheck TSH  If therapeutic plan to continue Synthroid at 50 mcg 1 p.o. every morning  -     TSH    4. Gastroesophageal reflux disease without esophagitis  -controlled  Status post EGD in past, history of lap band.  May continue Prilosec 20 mg 1 p.o. daily as needed    5.  Recurrent major depressive disorder, in full remission (HCC)  -controlled  Anxiety and depression both well controlled, prefers to continue on same dose of medication at this time.  No change.  Continue/refill Lexapro 20 mg 1 p.o. daily    6. Iron deficiency  -stable  Recommend restarting OTC iron, multivitamin, and OTC sublingual B12 (status post lap band.)    Other orders  -     escitalopram (LEXAPRO) 20 MG tablet; Take 1 tablet by mouth Daily.  Dispense: 90 tablet; Refill: 3  -     levothyroxine (SYNTHROID, LEVOTHROID) 50 MCG tablet; Take 1 tablet by mouth Daily.  Dispense: 90 tablet; Refill: 3  -     omeprazole (priLOSEC) 20 MG capsule; Take 1 capsule by mouth Daily.  Dispense: 90 capsule; Refill: 3        Follow Up   Return in about 1 year (around 3/3/2024) for Annual physical, Recheck, if all labs normal and doing well then follow-up 1 year.  Patient was given instructions and counseling regarding her condition or for health maintenance advice. Please see specific information pulled into the AVS if appropriate.

## 2023-03-03 NOTE — PATIENT INSTRUCTIONS
COVID booster, influenza vaccine, and shingles vaccines--- may obtain from the pharmacy    Recommend low fat/low calorie diet and exercise greater than 150 minutes of cardio per week.      Continue current treatment plan.

## 2023-03-04 LAB
CHOLEST SERPL-MCNC: 203 MG/DL (ref 0–200)
HDLC SERPL-MCNC: 59 MG/DL (ref 40–60)
LDLC SERPL CALC-MCNC: 128 MG/DL (ref 0–100)
LDLC/HDLC SERPL: 2.13 {RATIO}
TRIGL SERPL-MCNC: 92 MG/DL (ref 0–150)
TSH SERPL DL<=0.005 MIU/L-ACNC: 1.89 UIU/ML (ref 0.27–4.2)
VLDLC SERPL CALC-MCNC: 16 MG/DL (ref 5–40)

## 2023-10-16 ENCOUNTER — OFFICE VISIT (OUTPATIENT)
Dept: FAMILY MEDICINE CLINIC | Facility: CLINIC | Age: 53
End: 2023-10-16
Payer: COMMERCIAL

## 2023-10-16 VITALS
OXYGEN SATURATION: 99 % | HEART RATE: 88 BPM | DIASTOLIC BLOOD PRESSURE: 76 MMHG | HEIGHT: 64 IN | TEMPERATURE: 98 F | WEIGHT: 248 LBS | SYSTOLIC BLOOD PRESSURE: 118 MMHG | BODY MASS INDEX: 42.34 KG/M2

## 2023-10-16 DIAGNOSIS — M25.512 ACUTE PAIN OF LEFT SHOULDER: Primary | ICD-10-CM

## 2023-10-16 PROCEDURE — 99213 OFFICE O/P EST LOW 20 MIN: CPT | Performed by: NURSE PRACTITIONER

## 2023-10-16 RX ORDER — AMPHETAMINE 15.7 MG/1
15.7 TABLET, ORALLY DISINTEGRATING ORAL EVERY MORNING
COMMUNITY
Start: 2023-09-28

## 2023-10-16 RX ORDER — MELOXICAM 15 MG/1
15 TABLET ORAL DAILY PRN
Qty: 30 TABLET | Refills: 0 | Status: SHIPPED | OUTPATIENT
Start: 2023-10-16

## 2023-10-16 RX ORDER — ESCITALOPRAM OXALATE 10 MG/1
10 TABLET ORAL DAILY
COMMUNITY

## 2023-10-16 NOTE — PROGRESS NOTES
"Chief Complaint  Arm Pain (Pt is concerned that she pulled a muscle in L arm when moving furniture 1W ago. Muscle soreness from L shoulder into fingers. )    Subjective        Kymberly Edwards presents to [facility]  History of Present Illness  Kymberly Edwards is a 53 y.o. female who presents to clinic today to discuss left arm pain.  Patient was moving furniture 1 week ago which seemed to trigger her symptoms.  She denies a specific motion when she first felt the pain.  She also pushes heavy carts for work, which has not made things better.  Lying on her right arm seems to make things worse. She described her pain as dull and achy.  She has tried heat which has helped improve her pain.  She is also taking Tylenol.  Patient did injure her left arm years ago and went through physical therapy, which helped improve her symptoms.  She denies neck pain.  No chest pain or diaphoresis.    Review of Systems   Musculoskeletal:  Positive for myalgias.      Objective   Vital Signs:  /76   Pulse 88   Temp 98 °F (36.7 °C)   Ht 161.5 cm (63.58\")   Wt 112 kg (248 lb)   SpO2 99%   BMI 43.13 kg/m²   Estimated body mass index is 43.13 kg/m² as calculated from the following:    Height as of this encounter: 161.5 cm (63.58\").    Weight as of this encounter: 112 kg (248 lb).           Physical Exam  Vitals reviewed.   Constitutional:       General: She is not in acute distress.     Appearance: Normal appearance. She is not ill-appearing, toxic-appearing or diaphoretic.   HENT:      Head: Normocephalic and atraumatic.   Eyes:      General: No scleral icterus.        Right eye: No discharge.         Left eye: No discharge.   Pulmonary:      Effort: Pulmonary effort is normal. No respiratory distress.   Musculoskeletal:      Left shoulder: Tenderness present. No swelling, deformity, effusion, laceration, bony tenderness or crepitus. Normal range of motion.      Left elbow: No swelling, deformity, " effusion or lacerations. Normal range of motion. No tenderness.      Left forearm: Tenderness present. No swelling, edema, deformity, lacerations or bony tenderness.      Comments: Left shoulder exam: Normal active and passive range of motion as well as normal strength with shoulder flexion.  Normal range of motion and strength with internal rotation, external rotation, abduction. Decreased ROM with combined internal rotation and adduction.  Weakness with empty can test.  Drop arm test negative.     Myofascial tenderness left upper trapezius    Neurological:      General: No focal deficit present.      Mental Status: She is alert and oriented to person, place, and time.      Motor: No weakness.      Gait: Gait normal.   Psychiatric:         Mood and Affect: Mood normal.         Behavior: Behavior normal.        Result Review :                   Assessment and Plan   Diagnoses and all orders for this visit:    1. Acute pain of left shoulder (Primary)  -     Ambulatory Referral to Physical Therapy Evaluate and treat  -     meloxicam (MOBIC) 15 MG tablet; Take 1 tablet by mouth Daily As Needed for Mild Pain or Moderate Pain.  Dispense: 30 tablet; Refill: 0      Patient's left arm pain seems to be more localized to the left shoulder. Rotator cuff tendinitis? She also has myofascial tenderness and trigger points to left upper trapezius.  I recommended physical therapy.  Meloxicam prescribed for patient as needed and she is aware to take this with food.  She can continue Tylenol as needed.  Patient advised to call if symptoms do not improve.       Follow Up   Return if symptoms worsen or fail to improve.  Patient was given instructions and counseling regarding her condition or for health maintenance advice. Please see specific information pulled into the AVS if appropriate.       Electronically signed by HANS Arellano, 10/16/23, 3:30 PM EDT.

## 2023-11-27 DIAGNOSIS — M25.512 ACUTE PAIN OF LEFT SHOULDER: ICD-10-CM

## 2023-11-27 NOTE — TELEPHONE ENCOUNTER
Rx Refill Note  Requested Prescriptions     Pending Prescriptions Disp Refills    meloxicam (MOBIC) 15 MG tablet [Pharmacy Med Name: MELOXICAM 15 MG TABLET] 30 tablet 0     Sig: TAKE 1 TABLET BY MOUTH EVERY DAY AS NEEDED FOR MILD OR MODERATE PAIN      Last office visit with prescribing clinician: 10/16/2023   Last telemedicine visit with prescribing clinician: Visit date not found   Next office visit with prescribing clinician: Visit date not found                         Would you like a call back once the refill request has been completed: [] Yes [] No    If the office needs to give you a call back, can they leave a voicemail: [] Yes [] No    Deric Pinon CMA/LMR  11/27/23, 13:59 EST

## 2023-11-29 RX ORDER — MELOXICAM 15 MG/1
TABLET ORAL
Qty: 30 TABLET | Refills: 0 | Status: SHIPPED | OUTPATIENT
Start: 2023-11-29

## 2023-12-26 RX ORDER — LEVOTHYROXINE SODIUM 0.05 MG/1
50 TABLET ORAL DAILY
Qty: 90 TABLET | Refills: 1 | Status: SHIPPED | OUTPATIENT
Start: 2023-12-26

## 2024-02-04 DIAGNOSIS — M25.512 ACUTE PAIN OF LEFT SHOULDER: ICD-10-CM

## 2024-02-05 RX ORDER — MELOXICAM 15 MG/1
TABLET ORAL
Qty: 30 TABLET | Refills: 0 | Status: SHIPPED | OUTPATIENT
Start: 2024-02-05

## 2024-02-05 RX ORDER — ESCITALOPRAM OXALATE 20 MG/1
20 TABLET ORAL DAILY
Qty: 90 TABLET | Refills: 3 | Status: SHIPPED | OUTPATIENT
Start: 2024-02-05

## 2024-02-05 NOTE — TELEPHONE ENCOUNTER
Rx Refill Note  Requested Prescriptions     Pending Prescriptions Disp Refills    meloxicam (MOBIC) 15 MG tablet [Pharmacy Med Name: MELOXICAM 15 MG TABLET] 30 tablet 0     Sig: TAKE 1 TABLET BY MOUTH EVERY DAY AS NEEDED FOR MILD OR MODERATE PAIN      Last office visit with prescribing clinician: 3/3/2023   Last telemedicine visit with prescribing clinician: Visit date not found   Next office visit with prescribing clinician: 3/6/2024                         Would you like a call back once the refill request has been completed: [] Yes [] No    If the office needs to give you a call back, can they leave a voicemail: [] Yes [] No    Deric Pinon CMA/LMR  02/05/24, 08:04 EST

## 2024-03-06 ENCOUNTER — OFFICE VISIT (OUTPATIENT)
Dept: FAMILY MEDICINE CLINIC | Facility: CLINIC | Age: 54
End: 2024-03-06
Payer: COMMERCIAL

## 2024-03-06 VITALS
TEMPERATURE: 97.5 F | SYSTOLIC BLOOD PRESSURE: 100 MMHG | BODY MASS INDEX: 44.18 KG/M2 | WEIGHT: 258.8 LBS | OXYGEN SATURATION: 98 % | HEIGHT: 64 IN | HEART RATE: 82 BPM | DIASTOLIC BLOOD PRESSURE: 62 MMHG

## 2024-03-06 DIAGNOSIS — F98.8 ATTENTION DEFICIT DISORDER (ADD) WITHOUT HYPERACTIVITY: ICD-10-CM

## 2024-03-06 DIAGNOSIS — Z00.00 WELLNESS EXAMINATION: Primary | ICD-10-CM

## 2024-03-06 DIAGNOSIS — E66.01 OBESITY, CLASS III, BMI 40-49.9 (MORBID OBESITY): ICD-10-CM

## 2024-03-06 DIAGNOSIS — Z23 FLU VACCINE NEED: ICD-10-CM

## 2024-03-06 DIAGNOSIS — F41.8 ANXIETY WITH DEPRESSION: ICD-10-CM

## 2024-03-06 DIAGNOSIS — K21.9 GASTROESOPHAGEAL REFLUX DISEASE WITHOUT ESOPHAGITIS: ICD-10-CM

## 2024-03-06 DIAGNOSIS — E55.9 VITAMIN D DEFICIENCY: ICD-10-CM

## 2024-03-06 DIAGNOSIS — E03.9 HYPOTHYROIDISM, UNSPECIFIED TYPE: ICD-10-CM

## 2024-03-06 DIAGNOSIS — E78.5 MILD HYPERLIPIDEMIA: ICD-10-CM

## 2024-03-06 RX ORDER — LISDEXAMFETAMINE DIMESYLATE CAPSULES 30 MG/1
1 CAPSULE ORAL DAILY
COMMUNITY
Start: 2023-12-05

## 2024-03-06 NOTE — PROGRESS NOTES
Chief Complaint  Annual Exam (Yearly wellness with fasting labs has GYN for all female wellness had colonoscopy 11/2022), Anxiety, and Hypothyroidism    NEEDS ANNUAL WELLNESS  And  1 year follow-up on ERNESTO with depression, hypothyroidism    LOV with me March 2023 for wellness exam and chronic med refills with labs  All WNL, no med changes made.    Other interval history in the last year --now seeing a psychiatry provider for the new diagnosis of ADD.  She has been started on stimulant medication this past year, initially Adderall which was changed to Vyvanse recently.  Seems to be helping, doing better.     Overall, doing much better.  New grandbaby.  's health has been improving.  Trying to do better with a healthier diet, exercises sporadic.  Has regained about 10 pounds of her previously lost weight.  Sleep has improved, no chronic fatigue.  Rare snoring.  Mood good.  Maintains regular follow-up with gastric surgeon, GYN, dermatologist, and now seeing psychiatrist.     No new complaints or concerns today.  Needs chronic med refills, 90-day supply from mail order service.  She is fasting, due for yearly lipids and thyroid check  Compliant with and tolerates all medications without side effects.     Routine health maintenance/screening test:  PAP --- has gynecologist, up-to-date  MAMMO --- May 2023, negative  DEXA --no screening done yet, per gynecologist  Colorectal Screen --- C-scope done November 2022, normal, repeat 10 years  Vaccines --- due for COVID booster, influenza, shingles vaccine  Smoking/ETOH Status --- non-smoker.  No alcohol use  Dentist, Eye Exam, Derm --- maintains regular dental visits, eye exams, and has a dermatologist  Diet/Exercise --- tries to maintain a healthy low-cholesterol diet, exercise is sporadic at best  Pertinent FH --- negative for colon cancer, premature CAD, breast cancer        Review of Systems   Constitutional:  Negative for fever and unexpected weight change.  "  Respiratory:  Negative for cough and shortness of breath.    Cardiovascular:  Negative for chest pain.        Subjective          Kymberly Edwards presents to [facility]    Objective   Vital Signs:   Vitals:    03/06/24 1348   BP: 100/62   BP Location: Right arm   Patient Position: Sitting   Cuff Size: Adult   Pulse: 82   Temp: 97.5 °F (36.4 °C)   SpO2: 98%   Weight: 117 kg (258 lb 12.8 oz)   Height: 161.3 cm (63.5\")      Body mass index is 45.13 kg/m².   Physical Exam  Vitals and nursing note reviewed.   Constitutional:       Appearance: Normal appearance. She is well-developed. She is obese.   HENT:      Head: Normocephalic and atraumatic.      Nose: Nose normal.   Eyes:      Conjunctiva/sclera: Conjunctivae normal.      Pupils: Pupils are equal, round, and reactive to light.   Neck:      Thyroid: No thyromegaly.   Cardiovascular:      Rate and Rhythm: Normal rate and regular rhythm.      Heart sounds: Normal heart sounds. No murmur heard.  Pulmonary:      Effort: Pulmonary effort is normal.      Breath sounds: Normal breath sounds.   Abdominal:      General: Abdomen is flat. Bowel sounds are normal. There is no distension.      Palpations: Abdomen is soft. There is no hepatomegaly, splenomegaly or mass.      Tenderness: There is no abdominal tenderness. There is no guarding or rebound.      Hernia: No hernia is present.   Musculoskeletal:         General: Normal range of motion.      Cervical back: Normal range of motion and neck supple.      Right lower leg: No edema.      Left lower leg: No edema.   Lymphadenopathy:      Cervical: No cervical adenopathy.   Skin:     General: Skin is warm.   Neurological:      General: No focal deficit present.      Mental Status: She is alert.   Psychiatric:         Mood and Affect: Mood normal.         Behavior: Behavior normal.         Thought Content: Thought content normal.         Judgment: Judgment normal.        Result Review :         March " 2023 --- , CBC, CMP, TSH all WNL        Lab Results   Component Value Date    FERRITIN 6 (L) 10/19/2020    SVHK84FI 23.9 (L) 08/20/2021    FOLATE >20.0 08/26/2022                   Assessment and Plan    Diagnoses and all orders for this visit:    1. Wellness examination (Primary) --within normal limits except for BMI of 45.13  Has gynecologist, Pap and mammogram up-to-date.  All screening up-to-date except for needs labs listed below, influenza vaccine, shingles and COVID booster (plans to obtain from pharmacy in near future.)  Otherwise, really needs weight loss --Needs low-carb/low calorie/low cholesterol diet and increase cardio exercise to greater than 150 minutes weekly   -     CBC & Differential  -     Comprehensive Metabolic Panel  -     Lipid Panel With LDL / HDL Ratio    2. Obesity, Class III, BMI 40-49.9 (morbid obesity) --remains uncontrolled, BMI 45.13  Given her mental health has improved, now being treated for ADD by psychiatrist, do suggest making medication changes with her Lexapro.  Decrease Lexapro to 10 mg daily, hopefully this will help with her ability to lose weight.  Check A1c and TSH.  Suggested her following back up with her gastric surgeon for an adjustment with her Lap-Band?  Consider GLP-1's, likely cost prohibitive?  Again, stressed the importance of improving upon a healthier lifestyle --Needs low-carb/low calorie/low cholesterol diet and increase cardio exercise to greater than 150 minutes weekly   -     Hemoglobin A1c    3. Attention deficit disorder (ADD) without hyperactivity --new Dx  Currently being treated by psychiatrist, on Vyvanse.    4. Anxiety with depression --improved, doing well.  Suggest decreasing Lexapro from 20 mg to 10 mg daily given improved mental health and hopefully help with ability to lose weight.    5. Hypothyroidism, unspecified type --recheck TSH, if therapeutic then no change with Synthroid 50 mcg every morning  -     TSH    6. Mild hyperlipidemia  -mild.  Recheck lipids.  Low risk patient.  -     Lipid Panel With LDL / HDL Ratio    7. Vitamin D deficiency  -     Vitamin D,25-Hydroxy    8. Gastroesophageal reflux disease without esophagitis --confirmed GERD with EGD.  May continue Prilosec as needed    In addition to wellness exam today, seen and treated for separate and identifiable --- morbid obesity, new Dx ADD, ERNESTO with depression/medication adjustment          Follow Up   Return in about 1 year (around 3/6/2025) for Annual physical, if lab work all WNL and doing well then 1 year follow-up.  Patient was given instructions and counseling regarding her condition or for health maintenance advice. Please see specific information pulled into the AVS if appropriate.

## 2024-03-06 NOTE — PATIENT INSTRUCTIONS
Weight loss needed --Needs low-carb/low calorie/low cholesterol diet and increase cardio exercise to greater than 150 minutes weekly     May receive shingles vaccine COVID booster from pharmacy in near future    Continue current treatment plan.     May try decreasing Lexapro to 10 mg daily

## 2024-03-07 LAB
25(OH)D3+25(OH)D2 SERPL-MCNC: 19.8 NG/ML (ref 30–100)
ALBUMIN SERPL-MCNC: 4.2 G/DL (ref 3.8–4.9)
ALBUMIN/GLOB SERPL: 1.8 {RATIO} (ref 1.2–2.2)
ALP SERPL-CCNC: 140 IU/L (ref 44–121)
ALT SERPL-CCNC: 12 IU/L (ref 0–32)
AST SERPL-CCNC: 15 IU/L (ref 0–40)
BASOPHILS # BLD AUTO: 0 X10E3/UL (ref 0–0.2)
BASOPHILS NFR BLD AUTO: 1 %
BILIRUB SERPL-MCNC: 0.4 MG/DL (ref 0–1.2)
BUN SERPL-MCNC: 18 MG/DL (ref 6–24)
BUN/CREAT SERPL: 19 (ref 9–23)
CALCIUM SERPL-MCNC: 9.7 MG/DL (ref 8.7–10.2)
CHLORIDE SERPL-SCNC: 102 MMOL/L (ref 96–106)
CHOLEST SERPL-MCNC: 205 MG/DL (ref 100–199)
CO2 SERPL-SCNC: 22 MMOL/L (ref 20–29)
CREAT SERPL-MCNC: 0.97 MG/DL (ref 0.57–1)
EGFRCR SERPLBLD CKD-EPI 2021: 70 ML/MIN/1.73
EOSINOPHIL # BLD AUTO: 0.1 X10E3/UL (ref 0–0.4)
EOSINOPHIL NFR BLD AUTO: 2 %
ERYTHROCYTE [DISTWIDTH] IN BLOOD BY AUTOMATED COUNT: 13.1 % (ref 11.7–15.4)
GLOBULIN SER CALC-MCNC: 2.4 G/DL (ref 1.5–4.5)
GLUCOSE SERPL-MCNC: 82 MG/DL (ref 70–99)
HBA1C MFR BLD: 5.5 % (ref 4.8–5.6)
HCT VFR BLD AUTO: 42.4 % (ref 34–46.6)
HDLC SERPL-MCNC: 60 MG/DL
HGB BLD-MCNC: 13.9 G/DL (ref 11.1–15.9)
IMM GRANULOCYTES # BLD AUTO: 0 X10E3/UL (ref 0–0.1)
IMM GRANULOCYTES NFR BLD AUTO: 0 %
LDLC SERPL CALC-MCNC: 128 MG/DL (ref 0–99)
LDLC/HDLC SERPL: 2.1 RATIO (ref 0–3.2)
LYMPHOCYTES # BLD AUTO: 1.7 X10E3/UL (ref 0.7–3.1)
LYMPHOCYTES NFR BLD AUTO: 33 %
MCH RBC QN AUTO: 28.4 PG (ref 26.6–33)
MCHC RBC AUTO-ENTMCNC: 32.8 G/DL (ref 31.5–35.7)
MCV RBC AUTO: 87 FL (ref 79–97)
MONOCYTES # BLD AUTO: 0.3 X10E3/UL (ref 0.1–0.9)
MONOCYTES NFR BLD AUTO: 6 %
NEUTROPHILS # BLD AUTO: 3 X10E3/UL (ref 1.4–7)
NEUTROPHILS NFR BLD AUTO: 58 %
PLATELET # BLD AUTO: 300 X10E3/UL (ref 150–450)
POTASSIUM SERPL-SCNC: 4.8 MMOL/L (ref 3.5–5.2)
PROT SERPL-MCNC: 6.6 G/DL (ref 6–8.5)
RBC # BLD AUTO: 4.9 X10E6/UL (ref 3.77–5.28)
SODIUM SERPL-SCNC: 139 MMOL/L (ref 134–144)
TRIGL SERPL-MCNC: 98 MG/DL (ref 0–149)
TSH SERPL DL<=0.005 MIU/L-ACNC: 3.24 UIU/ML (ref 0.45–4.5)
VLDLC SERPL CALC-MCNC: 17 MG/DL (ref 5–40)
WBC # BLD AUTO: 5.1 X10E3/UL (ref 3.4–10.8)

## 2024-04-17 ENCOUNTER — OFFICE VISIT (OUTPATIENT)
Dept: BARIATRICS/WEIGHT MGMT | Facility: CLINIC | Age: 54
End: 2024-04-17
Payer: COMMERCIAL

## 2024-04-17 VITALS
HEIGHT: 66 IN | TEMPERATURE: 97.9 F | WEIGHT: 255.2 LBS | SYSTOLIC BLOOD PRESSURE: 133 MMHG | BODY MASS INDEX: 41.01 KG/M2 | HEART RATE: 80 BPM | DIASTOLIC BLOOD PRESSURE: 94 MMHG

## 2024-04-17 DIAGNOSIS — Z98.84 STATUS FOLLOWING GASTRIC BANDING SURGERY FOR WEIGHT LOSS: ICD-10-CM

## 2024-04-17 DIAGNOSIS — R11.10 REGURGITATION OF FOOD: ICD-10-CM

## 2024-04-17 DIAGNOSIS — E66.01 OBESITY, CLASS III, BMI 40-49.9 (MORBID OBESITY): Primary | ICD-10-CM

## 2024-04-17 DIAGNOSIS — Z71.3 DIETARY COUNSELING: ICD-10-CM

## 2024-04-17 RX ORDER — AMPHETAMINE 15.7 MG/1
TABLET, ORALLY DISINTEGRATING ORAL
COMMUNITY
Start: 2024-04-10

## 2024-04-17 NOTE — PROGRESS NOTES
[facility] BARIATRIC SURGERY  [address]  [facility] [id number] [phone]  [address]  [facility] [address] [phone]  Dept: [phone]  4/17/2024      Kymberly Edwards.  [id number]  [id number]  1970  female      Chief Complaint   Patient presents with    Follow-up     Follow up band       BH Post-Op Bariatric Surgery:   Kymberly Edwards is status post Lapband procedure APS, performed on 6/30/14 with 0mls    HPI:   Today's weight is 116 kg (255 lb 3.2 oz) pounds, today's BMI is Body mass index is 41.81 kg/m². and has a loss of 3 pounds since the last visit. The patient reports experiencing hunger, but decreased hunger and loss of appetite.     Kymberly Edwards denies abdominal pain. The patientdoes have vomiting or regurgitation. The patientdoes not have heartburn/reflux.    Patient had all fluid removed about a year and a half ago and f/u UGI. UGI looks good. She states she still has days regularly that she doesn't tolerated any sort of solid protein/meat. Would like to get back on track with her weight loss journey and dietary habits.    Diet and Exercise: Diet history reviewed and discussed with the patient. Weight loss/gains to date discussed with the patient. She reports eating 3 meals per day, a typical portion size of 1+ cup, eating 1 snack per day, drinking 4-5 8-oz. glasses of water per day. The patient cannot tolerate solid protein.   The patient is not eating protein first. The patient is limiting food volume. The patient is not taking vitamins. The patient is limiting snacking. The patient is not regular exercise. She is not drinking carbonated beverages.     The following portions of the patient's history were reviewed and updated as appropriate: allergies, current medications, past family history, past medical history, past social history, past surgical history, and problem list.    Review of Systems   Constitutional:   Positive for fatigue.   Gastrointestinal:  Positive for vomiting.   All other systems reviewed and are negative.      Vitals:    04/17/24 1409   BP: 133/94   Pulse: 80   Temp: 97.9 °F (36.6 °C)       Physical Exam  Vitals reviewed.   Constitutional:       Appearance: Normal appearance. She is well-developed. She is obese.   HENT:      Head: Normocephalic and atraumatic.   Cardiovascular:      Rate and Rhythm: Normal rate.   Pulmonary:      Effort: Pulmonary effort is normal.   Abdominal:      Palpations: Abdomen is soft.   Musculoskeletal:         General: Normal range of motion.   Skin:     General: Skin is warm and dry.   Neurological:      Mental Status: She is alert and oriented to person, place, and time.   Psychiatric:         Behavior: Behavior normal.         Thought Content: Thought content normal.         Judgment: Judgment normal.         Assessment: Post-operatively the patient would like to discuss options.     Encounter Diagnoses   Name Primary?    Obesity, Class III, BMI 40-49.9 (morbid obesity) Yes    Dietary counseling     Status following gastric banding surgery for weight loss     Regurgitation of food        Plan:      Gave patient handouts and reviewed dietary habits and suggestions. Heavy on protein and produce. Discussed potential band removal due to inability to tolerate solid foods without dysphagia/regurgitation. We reviewed conversion to sleeve as well. Also discussed GLP-1 therapy and gave her handouts regarding. Will follow up with dietician by phone.   No adjustment today. RTC for n/v/d/regurg. Encouraged patient to be sure to eat plenty of dense lean protein and high fiber foods like vegetables and fresh fruits while reducing simple carbohydrates. Reviewed the importance of eating solid foods vs. soft which will contribute to weight gain. Discussed the recommended amount of water to intake daily- half of body weight in ounces. Not drinking with or right after meals.    Activity  restrictions: None.   Instructions / Recommendations: dietary counseling recommended, recommended a daily protein intake of  grams, patient was advised that the lap band system works best when consuming solid foods, vitamin supplement(s) recommended, recommended exercising at least 150 minutes per week inclduing both cardio and strength training, behavior modifications recommended and instructed to call the office for concerns, questions, or problems.     The patient was instructed to follow up in 1 month     The patient was counseled regarding the LAP-BAND and how the band works.  Total time spent on this encounter was 30 minutes.  The chart was reviewed prior to starting the visit.

## 2024-04-25 RX ORDER — SEMAGLUTIDE 0.25 MG/.5ML
0.25 INJECTION, SOLUTION SUBCUTANEOUS WEEKLY
Qty: 2 ML | Refills: 0 | Status: SHIPPED | OUTPATIENT
Start: 2024-04-25

## 2024-05-23 ENCOUNTER — OFFICE VISIT (OUTPATIENT)
Dept: BARIATRICS/WEIGHT MGMT | Facility: CLINIC | Age: 54
End: 2024-05-23
Payer: COMMERCIAL

## 2024-05-23 VITALS
DIASTOLIC BLOOD PRESSURE: 88 MMHG | BODY MASS INDEX: 41.14 KG/M2 | HEIGHT: 66 IN | SYSTOLIC BLOOD PRESSURE: 134 MMHG | HEART RATE: 82 BPM | WEIGHT: 256 LBS | TEMPERATURE: 97.5 F

## 2024-05-23 DIAGNOSIS — E66.01 OBESITY, CLASS III, BMI 40-49.9 (MORBID OBESITY): Primary | ICD-10-CM

## 2024-05-23 DIAGNOSIS — Z98.84 H/O LAPAROSCOPIC ADJUSTABLE GASTRIC BANDING: ICD-10-CM

## 2024-05-23 DIAGNOSIS — Z71.3 DIETARY COUNSELING: ICD-10-CM

## 2024-05-23 RX ORDER — SEMAGLUTIDE 0.5 MG/.5ML
0.5 INJECTION, SOLUTION SUBCUTANEOUS WEEKLY
Qty: 2 ML | Refills: 0 | Status: SHIPPED | OUTPATIENT
Start: 2024-05-23

## 2024-05-23 RX ORDER — SEMAGLUTIDE 0.25 MG/.5ML
0.25 INJECTION, SOLUTION SUBCUTANEOUS WEEKLY
Qty: 2 ML | Refills: 0 | Status: SHIPPED | OUTPATIENT
Start: 2024-05-23

## 2024-05-23 NOTE — PROGRESS NOTES
[facility] BARIATRIC SURGERY  [address]  [facility] [id number] [phone]  [address]  [facility] [address] [phone]  Dept: [phone]  5/23/2024      Kymberly Edwards.  [id number]  [id number]  1970  female      Chief Complaint   Patient presents with    Follow-up     Band follow up        BH Post-Op Bariatric Surgery:   Kymberly Edwards is status post Lapband procedure APS, performed on 6/30/14 with 0mls    HPI:   Today's weight is 116 kg (256 lb) pounds, today's BMI is Body mass index is 41.94 kg/m². and has a gain of 1 pounds since the last visit. The patient reports experiencing hunger, but decreased hunger and loss of appetite.     Kymberly Edwards denies abdominal pain. The patientdoes not have vomiting or regurgitation. The patientdoes not have heartburn/reflux.    Made a lot of changes to dietary habits changes since last visit. Doing a lot more protein with meal replacement shake or premiere, p3 packs, using dairy based protein more. 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 She eats more of the her solid foods at home or when she has more time.     Diet and Exercise: Diet history reviewed and discussed with the patient. Weight loss/gains to date discussed with the patient. She reports eating 3-4 meals per day, a typical portion size of 1 cup, eating 1-2 snack per day, drinking 5+ 8-oz. glasses of water per day. The patient can tolerate solid protein.   The patient is eating protein first. The patient is limiting food volume. The patient is taking vitamins. The patient is limiting snacking. The patient is regular exercise. She is not drinking carbonated beverages.     The following portions of the patient's history were reviewed and updated as appropriate: allergies, current medications, past family history, past medical history, past social history, past surgical history, and problem  list.    Review of Systems   Constitutional:  Positive for fatigue.   All other systems reviewed and are negative.      Vitals:    05/23/24 0752   BP: 134/88   Pulse: 82   Temp: 97.5 °F (36.4 °C)       Physical Exam  Vitals reviewed.   Constitutional:       Appearance: Normal appearance. She is well-developed. She is obese.   HENT:      Head: Normocephalic and atraumatic.   Cardiovascular:      Rate and Rhythm: Normal rate.   Pulmonary:      Effort: Pulmonary effort is normal.   Abdominal:      Palpations: Abdomen is soft.   Musculoskeletal:         General: Normal range of motion.   Skin:     General: Skin is warm and dry.   Neurological:      Mental Status: She is alert and oriented to person, place, and time.   Psychiatric:         Behavior: Behavior normal.         Thought Content: Thought content normal.         Judgment: Judgment normal.         Assessment: Post-operatively the patient is doing well with dietary habits.     Encounter Diagnoses   Name Primary?    Obesity, Class III, BMI 40-49.9 (morbid obesity) Yes    H/O laparoscopic adjustable gastric banding     Dietary counseling        Plan:    Sent new rx for wegovy (trouble with obtaining from pharmacy) and discussed medication again. Sent first 2 doses of medication since supply is an issue. We discussed dosing adjustment and how to administer/store the medication properly. All questions were answered.  Patient would like to keep band for now with no fluid as she thinks it does provide some restriction. We discussed as long as she is able to tolerate solid/dense healthy foods without issues that is fine.  No adjustment today. RTC for n/v/d/regurg. Encouraged patient to be sure to eat plenty of dense lean protein and high fiber foods like vegetables and fresh fruits while reducing simple carbohydrates. Reviewed the importance of eating solid foods vs. soft which will contribute to weight gain. Discussed the recommended amount of water to intake daily-  half of body weight in ounces. Not drinking with or right after meals.    Activity restrictions: None.   Instructions / Recommendations: dietary counseling recommended, recommended a daily protein intake of  grams, patient was advised that the lap band system works best when consuming solid foods, vitamin supplement(s) recommended, recommended exercising at least 150 minutes per week inclduing both cardio and strength training, behavior modifications recommended and instructed to call the office for concerns, questions, or problems.     The patient was instructed to follow up in 2 months      The patient was counseled regarding the LAP-BAND and how the band works.  Total time spent on this encounter was 30 minutes.  The chart was reviewed prior to starting the visit.

## 2024-07-10 RX ORDER — SEMAGLUTIDE 0.25 MG/.5ML
0.25 INJECTION, SOLUTION SUBCUTANEOUS WEEKLY
Qty: 2 ML | Refills: 0 | Status: CANCELLED | OUTPATIENT
Start: 2024-07-10

## 2024-08-15 ENCOUNTER — OFFICE VISIT (OUTPATIENT)
Dept: BARIATRICS/WEIGHT MGMT | Facility: CLINIC | Age: 54
End: 2024-08-15
Payer: COMMERCIAL

## 2024-08-15 VITALS
SYSTOLIC BLOOD PRESSURE: 146 MMHG | WEIGHT: 244 LBS | HEART RATE: 73 BPM | BODY MASS INDEX: 39.21 KG/M2 | DIASTOLIC BLOOD PRESSURE: 95 MMHG | TEMPERATURE: 97.3 F | HEIGHT: 66 IN

## 2024-08-15 DIAGNOSIS — Z71.3 DIETARY COUNSELING: ICD-10-CM

## 2024-08-15 DIAGNOSIS — Z98.84 H/O LAPAROSCOPIC ADJUSTABLE GASTRIC BANDING: ICD-10-CM

## 2024-08-15 DIAGNOSIS — E66.01 OBESITY, CLASS III, BMI 40-49.9 (MORBID OBESITY): Primary | ICD-10-CM

## 2024-08-15 RX ORDER — SEMAGLUTIDE 1 MG/.5ML
1 INJECTION, SOLUTION SUBCUTANEOUS WEEKLY
Qty: 2 ML | Refills: 0 | Status: SHIPPED | OUTPATIENT
Start: 2024-08-15 | End: 2024-08-15 | Stop reason: SDUPTHER

## 2024-08-15 RX ORDER — SEMAGLUTIDE 1 MG/.5ML
1 INJECTION, SOLUTION SUBCUTANEOUS WEEKLY
Qty: 2 ML | Refills: 0 | Status: SHIPPED | OUTPATIENT
Start: 2024-08-15

## 2024-08-15 NOTE — PROGRESS NOTES
[facility] BARIATRIC SURGERY  [address]  [facility] [address] [phone]  [address]  [facility] [address] [phone]  Dept: [phone]  8/15/2024      Kymberly Edwards.  [id number]  [id number]  1970  female      Chief Complaint   Patient presents with   • Follow-up     F/u band wegovy         Post-Op Bariatric Surgery:   Kymberly Edwards is status post Lapband procedure aps, performed on 6/30/14 with 0mls    HPI:   Today's weight is 111 kg (244 lb) pounds, today's BMI is Body mass index is 39.97 kg/m². and has a loss of 12 pounds since the last visit. The patient reports experiencing hunger, but decreased hunger and loss of appetite.     Kymberly Edwards denies abdominal pain. The patientdoes not have vomiting or regurgitation. The patientdoes not have heartburn/reflux.    Doing well with wegovy and feels like it is helping with appetite. Denies and AE.     Diet and Exercise: Diet history reviewed and discussed with the patient. Weight loss/gains to date discussed with the patient. She reports eating 2-3 meals per day, a typical portion size of 1 cup, eating 1 snack per day, drinking 5+ 8-oz. glasses of water per day. The patient can tolerate solid protein.   The patient is eating protein first. The patient is limiting food volume. The patient is not taking vitamins. The patient is limiting snacking. The patient is regular exercise. She is not drinking carbonated beverages.     The following portions of the patient's history were reviewed and updated as appropriate: allergies, current medications, past family history, past medical history, past social history, past surgical history, and problem list.    Review of Systems   All other systems reviewed and are negative.    Vitals:    08/15/24 1403   BP: 146/95   Pulse: 73   Temp: 97.3 °F (36.3 °C)       Physical Exam  Vitals reviewed.   Constitutional:       Appearance: Normal  appearance. She is well-developed. She is obese.   HENT:      Head: Normocephalic and atraumatic.   Cardiovascular:      Rate and Rhythm: Normal rate.   Pulmonary:      Effort: Pulmonary effort is normal.   Abdominal:      Palpations: Abdomen is soft.   Musculoskeletal:         General: Normal range of motion.   Skin:     General: Skin is warm and dry.   Neurological:      Mental Status: She is alert and oriented to person, place, and time.   Psychiatric:         Behavior: Behavior normal.         Thought Content: Thought content normal.         Judgment: Judgment normal.     Assessment: Post-operatively the patient is doing well.     Encounter Diagnoses   Name Primary?   • Obesity, Class III, BMI 40-49.9 (morbid obesity) Yes   • H/O laparoscopic adjustable gastric banding    • Dietary counseling        Plan:      Doing well with wegovy and currently on 0.5mg. Rx'd next dose and discussed maintenance dose options. Stressed importance of hitting protein goals daily and incorporating strength traiing.  No adjustment today. RTC for n/v/d/regurg. Encouraged patient to be sure to eat plenty of dense lean protein and high fiber foods like vegetables and fresh fruits while reducing simple carbohydrates. Reviewed the importance of eating solid foods vs. soft which will contribute to weight gain. Discussed the recommended amount of water to intake daily- half of body weight in ounces. Not drinking with or right after meals.    Activity restrictions: None.   Instructions / Recommendations: dietary counseling recommended, recommended a daily protein intake of  grams, patient was advised that the lap band system works best when consuming solid foods, vitamin supplement(s) recommended, recommended exercising at least 150 minutes per week inclduing both cardio and strength training, behavior modifications recommended and instructed to call the office for concerns, questions, or problems.     The patient was instructed to  follow up in 2 months     The patient was counseled regarding the LAP-BAND and how the band works.  Total time spent face to face was  20 minutes.  The chart was reviewed prior to starting the visit.

## 2024-09-12 RX ORDER — SEMAGLUTIDE 1 MG/.5ML
INJECTION, SOLUTION SUBCUTANEOUS
Qty: 2 ML | Refills: 0 | Status: SHIPPED | OUTPATIENT
Start: 2024-09-12

## 2024-10-17 ENCOUNTER — OFFICE VISIT (OUTPATIENT)
Dept: BARIATRICS/WEIGHT MGMT | Facility: CLINIC | Age: 54
End: 2024-10-17
Payer: COMMERCIAL

## 2024-10-17 VITALS
HEART RATE: 80 BPM | TEMPERATURE: 98.1 F | WEIGHT: 240 LBS | SYSTOLIC BLOOD PRESSURE: 130 MMHG | BODY MASS INDEX: 38.57 KG/M2 | HEIGHT: 66 IN | DIASTOLIC BLOOD PRESSURE: 90 MMHG

## 2024-10-17 DIAGNOSIS — E66.812 OBESITY, CLASS II, BMI 35-39.9: Primary | ICD-10-CM

## 2024-10-17 DIAGNOSIS — Z98.84 H/O LAPAROSCOPIC ADJUSTABLE GASTRIC BANDING: ICD-10-CM

## 2024-10-17 DIAGNOSIS — Z71.3 DIETARY COUNSELING: ICD-10-CM

## 2024-10-17 PROCEDURE — 99213 OFFICE O/P EST LOW 20 MIN: CPT | Performed by: NURSE PRACTITIONER

## 2024-10-17 RX ORDER — SEMAGLUTIDE 1.7 MG/.75ML
1.7 INJECTION, SOLUTION SUBCUTANEOUS WEEKLY
Qty: 3 ML | Refills: 0 | Status: SHIPPED | OUTPATIENT
Start: 2024-10-17 | End: 2024-11-14

## 2024-10-17 NOTE — PROGRESS NOTES
[facility] BAR[facility]ATR[facility] BARIATRIC SURGERY  [address]  [facility] [address] [phone]  [address]  [facility] [address] [phone]  Dept: [phone]  10/17/2024      Kymberly Edwards.  [id number]  [id number]  1970  female      Chief Complaint   Patient presents with   • Follow-up     Fup wegovy        Post-Op Bariatric Surgery:   Kymberly Edwards is status post Lapband procedure APS, performed on 6/30/14 with 0mls    HPI:   Today's weight is 109 kg (240 lb) pounds, today's BMI is Body mass index is 39.32 kg/m². and has a loss of 4 pounds since the last visit. The patient reports experiencing hunger, but decreased hunger and loss of appetite.     Kymberly Edwards denies abdominal pain. The patientdoes not have vomiting or regurgitation. The patientdoes not have heartburn/reflux.    Doing well with wegovy.    Diet and Exercise: Diet history reviewed and discussed with the patient. Weight loss/gains to date discussed with the patient. She reports eating 2 meals per day, a typical portion size of 1 cup, eating 1 snack per day, drinking 5+ 8-oz. glasses of water per day. The patient can tolerate solid protein.   The patient is eating protein first. The patient is limiting food volume. The patient is not taking vitamins. The patient is limiting snacking. The patient is not regular exercise. She is not drinking carbonated beverages.     The following portions of the patient's history were reviewed and updated as appropriate: allergies, current medications, past family history, past medical history, past social history, past surgical history, and problem list.    Review of Systems   Constitutional:  Positive for fatigue.   All other systems reviewed and are negative.    Vitals:    10/17/24 1355   BP: 130/90   Pulse: 80   Temp: 98.1 °F (36.7 °C)       Physical Exam  Vitals reviewed.   Constitutional:       Appearance: Normal appearance. She is  well-developed. She is obese.   HENT:      Head: Normocephalic and atraumatic.   Cardiovascular:      Rate and Rhythm: Normal rate.   Pulmonary:      Effort: Pulmonary effort is normal.   Abdominal:      Palpations: Abdomen is soft.   Musculoskeletal:         General: Normal range of motion.   Skin:     General: Skin is warm and dry.   Neurological:      Mental Status: She is alert and oriented to person, place, and time.   Psychiatric:         Behavior: Behavior normal.         Thought Content: Thought content normal.         Judgment: Judgment normal.     Assessment: Post-operatively the patient is doing well.     Encounter Diagnoses   Name Primary?   • Obesity, Class II, BMI 35-39.9 Yes   • H/O laparoscopic adjustable gastric banding    • Dietary counseling        Plan:      Continue with wegovy and will increase to 1.7mg. Stressed the importance of getting enough protein daily and may need to incorporate protein shake. Add strength training.  No adjustment today. RTC for n/v/d/regurg. Encouraged patient to be sure to eat plenty of dense lean protein and high fiber foods like vegetables and fresh fruits while reducing simple carbohydrates. Reviewed the importance of eating solid foods vs. soft which will contribute to weight gain. Discussed the recommended amount of water to intake daily- half of body weight in ounces. Not drinking with or right after meals.    Activity restrictions: None.   Instructions / Recommendations: dietary counseling recommended, recommended a daily protein intake of  grams, patient was advised that the lap band system works best when consuming solid foods, vitamin supplement(s) recommended, recommended exercising at least 150 minutes per week inclduing both cardio and strength training, behavior modifications recommended and instructed to call the office for concerns, questions, or problems.     The patient was instructed to follow up in 3 months.      The patient was counseled  regarding the LAP-BAND and how the band works.  Total time spent face to face was 20 minutes.  The chart was reviewed prior to starting the visit.

## 2024-10-21 RX ORDER — LEVOTHYROXINE SODIUM 50 UG/1
50 TABLET ORAL DAILY
Qty: 90 TABLET | Refills: 1 | Status: SHIPPED | OUTPATIENT
Start: 2024-10-21

## 2024-10-25 ENCOUNTER — TELEPHONE (OUTPATIENT)
Dept: OBSTETRICS AND GYNECOLOGY | Age: 54
End: 2024-10-25

## 2024-10-25 NOTE — TELEPHONE ENCOUNTER
Caller: Kymberly Edwards    Relationship to patient: Self    Best call back number: [phone]    Chief complaint: MAMMOGRAM    Type of visit: MAMMOGRAM    Requested date: 11/1/24 BEFORE 1:45 P.M.    If rescheduling, when is the original appointment: NA     Additional notes:ANNUAL IS SCHEDULED FOR 11/1 - WOULD LIKE MAMMOGRAM SAME DAY - PLEASE CALL PT TO CONFIRM MAMMO APPT

## 2024-10-28 DIAGNOSIS — Z12.31 VISIT FOR SCREENING MAMMOGRAM: Primary | ICD-10-CM

## 2024-11-01 ENCOUNTER — OFFICE VISIT (OUTPATIENT)
Dept: OBSTETRICS AND GYNECOLOGY | Age: 54
End: 2024-11-01
Payer: COMMERCIAL

## 2024-11-01 VITALS
SYSTOLIC BLOOD PRESSURE: 114 MMHG | DIASTOLIC BLOOD PRESSURE: 66 MMHG | HEIGHT: 66 IN | WEIGHT: 235 LBS | BODY MASS INDEX: 37.77 KG/M2

## 2024-11-01 DIAGNOSIS — Z11.51 SCREENING FOR HUMAN PAPILLOMAVIRUS (HPV): ICD-10-CM

## 2024-11-01 DIAGNOSIS — Z12.4 SCREENING FOR MALIGNANT NEOPLASM OF CERVIX: ICD-10-CM

## 2024-11-01 DIAGNOSIS — N95.1 MENOPAUSAL VASOMOTOR SYNDROME: ICD-10-CM

## 2024-11-01 DIAGNOSIS — Z01.419 WELL FEMALE EXAM WITH ROUTINE GYNECOLOGICAL EXAM: Primary | ICD-10-CM

## 2024-11-01 DIAGNOSIS — Z80.3 FAMILY HISTORY OF BREAST CANCER: ICD-10-CM

## 2024-11-01 RX ORDER — FEZOLINETANT 45 MG/1
45 TABLET, FILM COATED ORAL EVERY 24 HOURS
Qty: 30 TABLET | Refills: 0 | Status: SHIPPED | OUTPATIENT
Start: 2024-11-01 | End: 2024-11-01

## 2024-11-01 NOTE — PROGRESS NOTES
Subjective     History of Present Illness    Chief Complaint   Patient presents with    Gynecologic Exam     Ae- last pap 2022 Neg, Hpv Neg, mammo scheduled  wants to discuss hormone therapy       Kymberly Edwards is a 54 y.o. female who presents for annual exam.  C/o hot flashes and night sweats x 3 years.  States this has been occurring since menopause but she is now suffering from insomnia from the night sweats and is interested in treatment.  Not a smoker. No hx of CV events. Patient's 10-year ASCVD risk is calculated to be 1.4%. CMP shows normal AST, ALT, and total bilirubin.  She currently takes Lexapro 20 mg daily and continues to have hot flashes/night sweats.  Family history of breast cancer in maternal aunt, dx 49 y/o.  Patient's [facility] genetic testing was negative.  She has screening mammogram scheduled for later this month, 2024.  Colonoscopy utd, normal in , repeat in 10 years.  Follows with PCP.   Declines STD testing.  Recently started Wegovy for weight loss.    Relevant data reviewed:  Comprehensive Metabolic Panel (2024 14:31)   Lipid Panel With LDL / HDL Ratio (2024 14:31)   COLONOSCOPY (2022 08:46)   LABORATORY - SCAN - [facility]/--/TRINY (2022)     Obstetric History:  OB History          2    Para   2    Term   2            AB        Living   2         SAB        IAB        Ectopic        Molar        Multiple        Live Births   2               Menstrual History:     Patient's last menstrual period was 2021 (within days).           Current contraception: post menopausal status  History of abnormal Pap smear: no  Received Gardasil immunization: no  Perform regular self breast exam: yes - occasionally  Family history of uterine or ovarian cancer: no  Family History of colon cancer: no  Family history of breast cancer: yes - maternal aunt, dx 49 y/o    PAP: done today  Mammogram:  scheduled for 2024  Colonoscopy: up to date -  "normal 11/11/2022, repeat 10 yrs  DEXA: not indicated    Exercise: not active  Calcium/Vitamin D: inadequate intake    The following portions of the patient's history were reviewed and updated as appropriate: allergies, current medications, past family history, past medical history, past social history, past surgical history, and problem list.    Review of Systems  Pertinent items are noted in HPI.       Objective   Physical Exam    /66   Ht 166.4 cm (65.51\")   Wt 107 kg (235 lb)   LMP 04/14/2021 (Within Days)   BMI 38.50 kg/m²      General: alert, appears stated age, cooperative, and no distress   Heart: regular rate and rhythm, S1, S2 normal, no murmur, click, rub or gallop   Lungs: clear to auscultation bilaterally   Abdomen: soft, non-tender, without masses or organomegaly   Breast: inspection negative, no nipple discharge or bleeding, no masses or nodularity palpable   External genitalia/Vulva: External genitalia including bartholin's glands, Urethra, [place]'s gland and urethra meatus are normal and Bladder appears normal without significant prolapse    Vagina: normal mucosa, normal discharge   Cervix: no lesions   Uterus: normal size and non-tender   Adnexa: normal adnexa and no mass, fullness, tenderness   Neurologic: Alert and Oriented x3   Psychiatric: Normal affect, judgement, and mood       Assessment & Plan   Diagnoses and all orders for this visit:    1. Well female exam with routine gynecological exam (Primary)  -     IGP, Apt HPV,rfx 16 / 18,45    2. Screening for human papillomavirus (HPV)  -     IGP, Apt HPV,rfx 16 / 18,45    3. Screening for malignant neoplasm of cervix  -     IGP, Apt HPV,rfx 16 / 18,45    4. Menopausal vasomotor syndrome  -     Discontinue: Fezolinetant (Veozah) 45 MG tablet; Take 1 tablet by mouth Daily.  Dispense: 30 tablet; Refill: 0    5. Family history of breast cancer          PAP smear with HPV co-testing completed today  Will call patient with results and treat " accordingly.   All questions answered.  Breast self exam technique reviewed and patient encouraged to perform self-exam monthly.  Physical activity and regular exercise encouraged.  Discussed healthy lifestyle modifications.  Discussed calcium and vitamin D needs to prevent osteoporosis.  --Screening mammogram scheduled for 11/27/2024.    --Menopausal vasomotor syndrome - reviewed treatment options of hormone replacement therapy, SSRI, or Veozah.  Patient already taking Lexapro 20 mg and not seeing relief of hot flashes or night sweats.  Patient CMP showed normal AST, ALT, total bilirubin.  Will try Veozah for her symptom relief, samples given to patient.  Informed patient to notify me in a week if Veozah has helped and we will send in the prescription if so.  If Veozah has not helped, we will then start hormone replacement therapy.  HRT risks and benefits were reviewed today and patient verbalized understanding.    Return in 1 year (on 11/1/2025) for Annual exam.

## 2024-11-05 LAB
CYTOLOGIST CVX/VAG CYTO: NORMAL
CYTOLOGY CVX/VAG DOC CYTO: NORMAL
CYTOLOGY CVX/VAG DOC THIN PREP: NORMAL
DX ICD CODE: NORMAL
HPV I/H RISK 4 DNA CVX QL PROBE+SIG AMP: NEGATIVE
Lab: NORMAL
OTHER STN SPEC: NORMAL
STAT OF ADQ CVX/VAG CYTO-IMP: NORMAL

## 2024-11-05 RX ORDER — ONDANSETRON 4 MG/1
4 TABLET, ORALLY DISINTEGRATING ORAL EVERY 4 HOURS PRN
Qty: 30 TABLET | Refills: 0 | Status: SHIPPED | OUTPATIENT
Start: 2024-11-05

## 2024-11-14 ENCOUNTER — TELEPHONE (OUTPATIENT)
Dept: OBSTETRICS AND GYNECOLOGY | Age: 54
End: 2024-11-14
Payer: COMMERCIAL

## 2024-11-14 DIAGNOSIS — N95.1 MENOPAUSAL VASOMOTOR SYNDROME: Primary | ICD-10-CM

## 2024-11-14 RX ORDER — FEZOLINETANT 45 MG/1
45 TABLET, FILM COATED ORAL EVERY 24 HOURS
Qty: 30 TABLET | Refills: 11 | Status: SHIPPED | OUTPATIENT
Start: 2024-11-14

## 2024-11-14 NOTE — TELEPHONE ENCOUNTER
Patient calling to inform Jai that she is pleased with the Veozah and would like a rx sent to her [facility] pharmacy in [place].Thanks you.

## 2024-11-19 RX ORDER — SEMAGLUTIDE 1.7 MG/.75ML
INJECTION, SOLUTION SUBCUTANEOUS
Qty: 3 ML | Refills: 0 | Status: SHIPPED | OUTPATIENT
Start: 2024-11-19

## 2024-11-27 ENCOUNTER — HOSPITAL ENCOUNTER (OUTPATIENT)
Facility: HOSPITAL | Age: 54
Discharge: HOME | End: 2024-11-27
Payer: COMMERCIAL

## 2024-11-27 DIAGNOSIS — Z12.31 VISIT FOR SCREENING MAMMOGRAM: ICD-10-CM

## 2024-11-27 PROCEDURE — 77063 BREAST TOMOSYNTHESIS BI: CPT

## 2024-11-27 PROCEDURE — 77067 SCR MAMMO BI INCL CAD: CPT

## 2024-12-17 RX ORDER — SEMAGLUTIDE 1.7 MG/.75ML
INJECTION, SOLUTION SUBCUTANEOUS
Qty: 3 ML | Refills: 1 | Status: SHIPPED | OUTPATIENT
Start: 2024-12-17

## 2025-01-13 RX ORDER — ESCITALOPRAM OXALATE 20 MG/1
20 TABLET ORAL DAILY
Qty: 90 TABLET | Refills: 3 | Status: SHIPPED | OUTPATIENT
Start: 2025-01-13

## 2025-01-16 ENCOUNTER — OFFICE VISIT (OUTPATIENT)
Dept: BARIATRICS/WEIGHT MGMT | Facility: CLINIC | Age: 55
End: 2025-01-16
Payer: COMMERCIAL

## 2025-01-16 VITALS
DIASTOLIC BLOOD PRESSURE: 90 MMHG | TEMPERATURE: 98.3 F | WEIGHT: 231 LBS | HEART RATE: 82 BPM | SYSTOLIC BLOOD PRESSURE: 136 MMHG | HEIGHT: 66 IN | BODY MASS INDEX: 37.12 KG/M2

## 2025-01-16 DIAGNOSIS — Z71.3 DIETARY COUNSELING: ICD-10-CM

## 2025-01-16 DIAGNOSIS — Z98.84 H/O LAPAROSCOPIC ADJUSTABLE GASTRIC BANDING: ICD-10-CM

## 2025-01-16 DIAGNOSIS — E66.812 OBESITY, CLASS II, BMI 35-39.9: Primary | ICD-10-CM

## 2025-01-16 DIAGNOSIS — E55.9 VITAMIN D DEFICIENCY: ICD-10-CM

## 2025-01-16 PROCEDURE — 99213 OFFICE O/P EST LOW 20 MIN: CPT | Performed by: NURSE PRACTITIONER

## 2025-01-16 RX ORDER — SEMAGLUTIDE 1.7 MG/.75ML
1.7 INJECTION, SOLUTION SUBCUTANEOUS WEEKLY
Qty: 3 ML | Refills: 3 | Status: SHIPPED | OUTPATIENT
Start: 2025-01-16 | End: 2025-05-08

## 2025-01-16 NOTE — PROGRESS NOTES
[facility] BARIATRIC SURGERY  [address]  [facility] [address] [phone]  [address]  [facility] [place] [id number] [phone]  Dept: [phone]  1/16/2025      Kymberly Edwards.  [id number]  [id number]  1970  female      Chief Complaint   Patient presents with   • Follow-up     Fup wegovy        Post-Op Bariatric Surgery:   Kymberly Edwards is status post Lapband procedure APS, performed on 6/30/14 with 0mls    HPI:   Today's weight is 105 kg (231 lb) pounds, today's BMI is Body mass index is 37.84 kg/m². and has a loss of 9 pounds since the last visit. The patient reports experiencing hunger, but decreased hunger and loss of appetite.     Kymberly Edwards denies abdominal pain. The patientdoes not have vomiting or regurgitation. The patientdoes not have heartburn/reflux.    Diet and Exercise: Diet history reviewed and discussed with the patient. Weight loss/gains to date discussed with the patient. She reports eating 3 meals per day, a typical portion size of 1 cup, eating 1 snack per day, drinking 5+ 8-oz. glasses of water per day. The patient can tolerate solid protein.   The patient is eating protein first. The patient is limiting food volume. The patient is taking vitamins. The patient is limiting snacking. The patient is regular exercise. She is not drinking carbonated beverages.     The following portions of the patient's history were reviewed and updated as appropriate: allergies, current medications, past family history, past medical history, past social history, past surgical history, and problem list.    Review of Systems   All other systems reviewed and are negative.    Vitals:    01/16/25 1358   BP: 136/90   Pulse: 82   Temp: 98.3 °F (36.8 °C)       Physical Exam  Vitals reviewed.   Constitutional:       Appearance: Normal appearance. She is well-developed. She is obese.   HENT:      Head: Normocephalic and atraumatic.    Cardiovascular:      Rate and Rhythm: Normal rate.   Pulmonary:      Effort: Pulmonary effort is normal.   Abdominal:      Palpations: Abdomen is soft.   Musculoskeletal:         General: Normal range of motion.   Skin:     General: Skin is warm and dry.   Neurological:      Mental Status: She is alert and oriented to person, place, and time.   Psychiatric:         Behavior: Behavior normal.         Thought Content: Thought content normal.         Judgment: Judgment normal.     Assessment: Post-operatively the patient is doing well.     Encounter Diagnoses   Name Primary?   • Obesity, Class II, BMI 35-39.9 Yes   • H/O laparoscopic adjustable gastric banding    • Dietary counseling    • Vitamin D deficiency        Plan:    Continue with wegovy as she is doing really well. Recommended she add strength training.  No adjustment today. RTC for n/v/d/regurg. Encouraged patient to be sure to eat plenty of dense lean protein and high fiber foods like vegetables and fresh fruits while reducing simple carbohydrates. Reviewed the importance of eating solid foods vs. soft which will contribute to weight gain. Discussed the recommended amount of water to intake daily- half of body weight in ounces. Not drinking with or right after meals.    Activity restrictions: None.   Instructions / Recommendations: dietary counseling recommended, recommended a daily protein intake of  grams, patient was advised that the lap band system works best when consuming solid foods, vitamin supplement(s) recommended, recommended exercising at least 150 minutes per week inclduing both cardio and strength training, behavior modifications recommended and instructed to call the office for concerns, questions, or problems.     The patient was instructed to follow up in 3 months     The patient was counseled regarding the LAP-BAND and how the band works.  Total time spent face to face was 20 minutes.  The chart was reviewed prior to starting the  visit.

## 2025-04-08 ENCOUNTER — OFFICE VISIT (OUTPATIENT)
Dept: FAMILY MEDICINE CLINIC | Facility: CLINIC | Age: 55
End: 2025-04-08
Payer: COMMERCIAL

## 2025-04-08 VITALS
OXYGEN SATURATION: 100 % | HEART RATE: 90 BPM | SYSTOLIC BLOOD PRESSURE: 124 MMHG | HEIGHT: 66 IN | DIASTOLIC BLOOD PRESSURE: 76 MMHG | BODY MASS INDEX: 36 KG/M2 | TEMPERATURE: 97.5 F | WEIGHT: 224 LBS

## 2025-04-08 DIAGNOSIS — Z00.00 WELLNESS EXAMINATION: Primary | ICD-10-CM

## 2025-04-08 DIAGNOSIS — E61.1 IRON DEFICIENCY: ICD-10-CM

## 2025-04-08 DIAGNOSIS — E55.9 VITAMIN D DEFICIENCY: ICD-10-CM

## 2025-04-08 DIAGNOSIS — Z23 NEED FOR PNEUMOCOCCAL 20-VALENT CONJUGATE VACCINATION: ICD-10-CM

## 2025-04-08 DIAGNOSIS — E03.9 HYPOTHYROIDISM, UNSPECIFIED TYPE: ICD-10-CM

## 2025-04-08 DIAGNOSIS — E78.5 MILD HYPERLIPIDEMIA: ICD-10-CM

## 2025-04-08 DIAGNOSIS — E66.812 OBESITY, CLASS II, BMI 35-39.9: ICD-10-CM

## 2025-04-08 DIAGNOSIS — F41.8 ANXIETY WITH DEPRESSION: ICD-10-CM

## 2025-04-08 DIAGNOSIS — Z98.84 H/O LAPAROSCOPIC ADJUSTABLE GASTRIC BANDING: ICD-10-CM

## 2025-04-08 RX ORDER — LEVOTHYROXINE SODIUM 50 UG/1
50 TABLET ORAL DAILY
Qty: 90 TABLET | Refills: 3 | Status: SHIPPED | OUTPATIENT
Start: 2025-04-08

## 2025-04-08 RX ORDER — ESCITALOPRAM OXALATE 20 MG/1
20 TABLET ORAL DAILY
Qty: 90 TABLET | Refills: 3 | Status: CANCELLED | OUTPATIENT
Start: 2025-04-08

## 2025-04-08 NOTE — PATIENT INSTRUCTIONS
Further recommendations based on today's labs, regarding vitamin D?  Iron, thyroid    may received shingles vaccine and COVID-vaccine from pharmacy in near future    Continue with healthy lifestyle, doing a good job---Needs low-carb/low calorie/low cholesterol diet and increase cardio exercise to greater than 150 minutes weekly

## 2025-04-08 NOTE — PROGRESS NOTES
Chief Complaint  Annual Exam (Yearly wellness has GYN for all female wellness had colonoscopy 2022 mammogram 11/2024 patient is fasting), Hypothyroidism, and Anxiety      NEEDS ANNUAL WELLNESS  And  1 year follow-up on ERNESTO with depression, hypothyroidism, obesity, weight management, and new Dx vitamin D deficiency     LOV with me March 2024 for wellness exam and chronic med refills with labs  --Discussed decreasing her Lexapro to 10 mg daily given improved mood and need for weight loss.  --Discussed new medication added by psychiatrist for ADD/Vyvanse.  --Suggested referral back to gastric lap band specialist given difficulty losing weight, any adjustment needed?  -- All yearly labs WNL except for very low vitamin D level.  Therefore started on prescription strength vitamin D x 4 months.    She does not believe she ever decrease the dose of Lexapro at last visit?  Remains on 20 mg daily  She did start the vitamin D prescription, completed.       Other interval history in the last year --- maintains regular follow-up with psychiatry provider/NP/Alex Mercedes for ADD treatment and ERNESTO med.  Now on new ADD med, no change with Lexapro?  Never changed the dosage, remains on 20 mg daily.?    Also started seeing bariatric provider/NP/Jose J again for weight loss, records reviewed.  Last visit with bariatric provider about 3 MONTHS AGO  Office Visit with Flor Lux APRN (01/16/2025) --- routine follow-up for Wegovy prescription.  Doing well with successful weight loss and tolerating.  No change with dose adjustment.  Lab orders remain active in the computer for annual follow-up and Lap-Band surgery in distant past      Doing much better overall!  New grandbaby.  's health improved.  Has successfully lost about 40 pounds since starting Wegovy 1 year ago, tolerating without GI side effects.  Improve diet and cardio exercise.  Mood remains good.  Sleep has improved.  Energy level improved.  No abdominal pain, nausea  "or vomiting.       No new complaints or concerns today.  Needs chronic med refills, 90-day supply from mail order service.  She is fasting, due for yearly lipids and thyroid check  Requesting labs to be completed from specialist/bariatric provider.  Compliant with and tolerates all medications without side effects, including Wegovy.  Also maintains regular follow-up with gynecologist and dermatologist       Routine health maintenance/screening test:  PAP --- has gynecologist, up-to-date  MAMMO --- November 2024, negative  DEXA --no screening done yet, per gynecologist  Colorectal Screen --- C-scope done November 2022, normal, repeat 10 years  Vaccines --- not up-to-date.  Smoking/ETOH Status --- non-smoker.  No alcohol use  Dentist, Eye Exam, Derm --- maintains regular dental visits, eye exams, and has a dermatologist  Diet/Exercise --- tries to maintain a healthy low-cholesterol diet and has started to incorporate exercise into her routine.  Pertinent FH --- negative for colon cancer, premature CAD, breast cancer        Review of Systems   Constitutional:  Negative for fever and unexpected weight change.   Respiratory:  Negative for cough and shortness of breath.    Cardiovascular:  Negative for chest pain.        Subjective          Kymberly Edwards presents to [facility]    Objective   Vital Signs:   Vitals:    04/08/25 1331   BP: 124/76   BP Location: Left arm   Patient Position: Sitting   Cuff Size: Large Adult   Pulse: 90   Temp: 97.5 °F (36.4 °C)   SpO2: 100%   Weight: 102 kg (224 lb)   Height: 166.4 cm (65.5\")      Body mass index is 36.71 kg/m².   Physical Exam  Vitals and nursing note reviewed.   Constitutional:       Appearance: Normal appearance. She is well-developed. She is obese.   HENT:      Head: Normocephalic and atraumatic.      Nose: Nose normal.   Eyes:      Conjunctiva/sclera: Conjunctivae normal.      Pupils: Pupils are equal, round, and reactive to light.   Neck:    "   Thyroid: No thyromegaly.   Cardiovascular:      Rate and Rhythm: Normal rate and regular rhythm.      Heart sounds: Normal heart sounds. No murmur heard.  Pulmonary:      Effort: Pulmonary effort is normal. No respiratory distress.      Breath sounds: Normal breath sounds. No wheezing or rales.   Abdominal:      General: Abdomen is flat. Bowel sounds are normal. There is no distension.      Palpations: Abdomen is soft. There is no hepatomegaly, splenomegaly or mass.      Tenderness: There is no abdominal tenderness. There is no guarding or rebound.      Hernia: No hernia is present.   Musculoskeletal:         General: Normal range of motion.      Cervical back: Normal range of motion and neck supple.      Right lower leg: No edema.      Left lower leg: No edema.   Lymphadenopathy:      Cervical: No cervical adenopathy.   Skin:     General: Skin is warm.   Neurological:      General: No focal deficit present.      Mental Status: She is alert.   Psychiatric:         Mood and Affect: Mood normal.         Behavior: Behavior normal.         Thought Content: Thought content normal.         Judgment: Judgment normal.        Result Review :               Lab Results   Component Value Date    FERRITIN 6 (L) 10/19/2020    RYNN94EB 19.8 (L) 03/06/2024    FOLATE >20.0 08/26/2022      Lipid Panel With LDL / HDL Ratio (03/06/2024 14:31)              Assessment and Plan    Diagnoses and all orders for this visit:    1. Wellness examination (Primary) --WNL except for BMI of 36.7  Has gynecologist, Pap smear and mammogram up-to-date.  C-scope up-to-date.  All screening up-to-date except for needs CBC, lipid profile, CMP.  Also needs pneumococcal vaccine, COVID-vaccine and shingles vaccines.  Otherwise, continue to improve on healthy lifestyle --doing a good job  Needs low-carb/low calorie/low cholesterol diet and increase cardio exercise to greater than 150 minutes weekly  -     CBC & Differential  -     Comprehensive  Metabolic Panel    2. Obesity, Class II, BMI 35-39.9 --remains uncontrolled, BMI 36.7  Although improving with the addition of Wegovy, tolerating.  Under care of bariatric surgeon.  Screening A1c.  Did discuss possibly also decreasing her dose of Lexapro given doing well to help with needed weight loss?  -     Hemoglobin A1c    3. H/O laparoscopic adjustable gastric banding --stable.  Asymptomatic.  Needs yearly screening labs  -     Vitamin B12 & Folate  -     Methylmalonic Acid, Serum  -     Iron and TIBC  -     Ferritin    4. Iron deficiency --appears to be stable  Currently on no OTC supplementation.  Check lab work given history of lap band  -     Vitamin B12 & Folate  -     Methylmalonic Acid, Serum  -     Iron and TIBC  -     Ferritin    5. Vitamin D deficiency --new Dx  Moderately low vitamin D diagnosed at last visit.  S/P prescription strength vitamin D x 4 months.  Recheck level today, further recommendations to follow regarding possible OTC supplementation versus prescription strength?  -     Vitamin D,25-Hydroxy    6. Mild hyperlipidemia --stable  Continue to monitor, low risk patient.  Needs low-carb/low calorie/low cholesterol diet and increase cardio exercise to greater than 150 minutes weekly  -     Lipid Panel With LDL / HDL Ratio  -     TSH    7. Hypothyroidism, unspecified type --appears stable  Recheck TSH, therapeutic then no change with Synthroid  -     TSH    8. Anxiety with depression --well-controlled  Currently on Lexapro 20 mg daily, may consider decreasing to 10 mg given doing well.?  Plans to further discuss with her psychiatrist    Other orders  -     levothyroxine (SYNTHROID, LEVOTHROID) 50 MCG tablet; Take 1 tablet by mouth Daily.  Dispense: 90 tablet; Refill: 3    In addition to wellness exam today, seen and treated for separate and identifiable --new Dx vitamin D deficiency, weight loss management        Follow Up   Return in about 1 year (around 4/8/2026) for Annual  physical.  Patient was given instructions and counseling regarding her condition or for health maintenance advice. Please see specific information pulled into the AVS if appropriate.

## 2025-04-12 LAB
25(OH)D3+25(OH)D2 SERPL-MCNC: 18 NG/ML (ref 30–100)
ALBUMIN SERPL-MCNC: 4.3 G/DL (ref 3.8–4.9)
ALP SERPL-CCNC: 127 IU/L (ref 44–121)
ALT SERPL-CCNC: 9 IU/L (ref 0–32)
AST SERPL-CCNC: 15 IU/L (ref 0–40)
BASOPHILS # BLD AUTO: 0 X10E3/UL (ref 0–0.2)
BASOPHILS NFR BLD AUTO: 1 %
BILIRUB SERPL-MCNC: 0.3 MG/DL (ref 0–1.2)
BUN SERPL-MCNC: 12 MG/DL (ref 6–24)
BUN/CREAT SERPL: 11 (ref 9–23)
CALCIUM SERPL-MCNC: 9.6 MG/DL (ref 8.7–10.2)
CHLORIDE SERPL-SCNC: 105 MMOL/L (ref 96–106)
CHOLEST SERPL-MCNC: 170 MG/DL (ref 100–199)
CO2 SERPL-SCNC: 22 MMOL/L (ref 20–29)
CREAT SERPL-MCNC: 1.11 MG/DL (ref 0.57–1)
EGFRCR SERPLBLD CKD-EPI 2021: 59 ML/MIN/1.73
EOSINOPHIL # BLD AUTO: 0.1 X10E3/UL (ref 0–0.4)
EOSINOPHIL NFR BLD AUTO: 1 %
ERYTHROCYTE [DISTWIDTH] IN BLOOD BY AUTOMATED COUNT: 13.1 % (ref 11.7–15.4)
FERRITIN SERPL-MCNC: 95 NG/ML (ref 15–150)
FOLATE SERPL-MCNC: 3.7 NG/ML
GLOBULIN SER CALC-MCNC: 2.1 G/DL (ref 1.5–4.5)
GLUCOSE SERPL-MCNC: 79 MG/DL (ref 70–99)
HBA1C MFR BLD: 5.3 % (ref 4.8–5.6)
HCT VFR BLD AUTO: 46.1 % (ref 34–46.6)
HDLC SERPL-MCNC: 50 MG/DL
HGB BLD-MCNC: 14.9 G/DL (ref 11.1–15.9)
IMM GRANULOCYTES # BLD AUTO: 0 X10E3/UL (ref 0–0.1)
IMM GRANULOCYTES NFR BLD AUTO: 0 %
IRON SATN MFR SERPL: 30 % (ref 15–55)
IRON SERPL-MCNC: 114 UG/DL (ref 27–159)
LDLC SERPL CALC-MCNC: 102 MG/DL (ref 0–99)
LDLC/HDLC SERPL: 2 RATIO (ref 0–3.2)
LYMPHOCYTES # BLD AUTO: 1.8 X10E3/UL (ref 0.7–3.1)
LYMPHOCYTES NFR BLD AUTO: 28 %
MCH RBC QN AUTO: 29.6 PG (ref 26.6–33)
MCHC RBC AUTO-ENTMCNC: 32.3 G/DL (ref 31.5–35.7)
MCV RBC AUTO: 92 FL (ref 79–97)
METHYLMALONATE SERPL-SCNC: 184 NMOL/L (ref 0–378)
MONOCYTES # BLD AUTO: 0.4 X10E3/UL (ref 0.1–0.9)
MONOCYTES NFR BLD AUTO: 6 %
NEUTROPHILS # BLD AUTO: 4.1 X10E3/UL (ref 1.4–7)
NEUTROPHILS NFR BLD AUTO: 64 %
PLATELET # BLD AUTO: 333 X10E3/UL (ref 150–450)
POTASSIUM SERPL-SCNC: 4.6 MMOL/L (ref 3.5–5.2)
PROT SERPL-MCNC: 6.4 G/DL (ref 6–8.5)
RBC # BLD AUTO: 5.04 X10E6/UL (ref 3.77–5.28)
SODIUM SERPL-SCNC: 140 MMOL/L (ref 134–144)
TIBC SERPL-MCNC: 382 UG/DL (ref 250–450)
TRIGL SERPL-MCNC: 97 MG/DL (ref 0–149)
TSH SERPL DL<=0.005 MIU/L-ACNC: 2.32 UIU/ML (ref 0.45–4.5)
UIBC SERPL-MCNC: 268 UG/DL (ref 131–425)
VIT B12 SERPL-MCNC: 413 PG/ML (ref 232–1245)
VLDLC SERPL CALC-MCNC: 18 MG/DL (ref 5–40)
WBC # BLD AUTO: 6.3 X10E3/UL (ref 3.4–10.8)

## 2025-04-21 RX ORDER — ERGOCALCIFEROL 1.25 MG/1
50000 CAPSULE, LIQUID FILLED ORAL WEEKLY
Qty: 13 CAPSULE | Refills: 1 | Status: SHIPPED | OUTPATIENT
Start: 2025-04-21

## 2025-04-24 ENCOUNTER — OFFICE VISIT (OUTPATIENT)
Dept: BARIATRICS/WEIGHT MGMT | Facility: CLINIC | Age: 55
End: 2025-04-24
Payer: COMMERCIAL

## 2025-04-24 VITALS
HEIGHT: 66 IN | BODY MASS INDEX: 36.16 KG/M2 | TEMPERATURE: 98 F | SYSTOLIC BLOOD PRESSURE: 132 MMHG | DIASTOLIC BLOOD PRESSURE: 93 MMHG | HEART RATE: 78 BPM | WEIGHT: 225 LBS

## 2025-04-24 DIAGNOSIS — E66.812 OBESITY, CLASS II, BMI 35-39.9: Primary | ICD-10-CM

## 2025-04-24 DIAGNOSIS — Z98.84 H/O LAPAROSCOPIC ADJUSTABLE GASTRIC BANDING: ICD-10-CM

## 2025-04-24 DIAGNOSIS — Z71.3 DIETARY COUNSELING: ICD-10-CM

## 2025-04-24 PROCEDURE — 99213 OFFICE O/P EST LOW 20 MIN: CPT | Performed by: NURSE PRACTITIONER

## 2025-04-24 NOTE — PROGRESS NOTES
[facility]ATR[facility] BARIATRIC SURGERY  [address]  [facility] [id number] [phone]  [address]  [facility] [id number] [phone]  Dept: [phone]  4/24/2025      Kymberly Edwards.  [id number]  [id number]  1970  female      Chief Complaint   Patient presents with   • Follow-up     Follow up band/RX       BH Post-Op Bariatric Surgery:   Kymberly Edwards is status post Lapband procedure APS, performed on 6/30/14 with 0mls    HPI:   Today's weight is 102 kg (225 lb) pounds, today's BMI is Body mass index is 36.86 kg/m². and has a loss of 6 pounds since the last visit. The patient reports experiencing hunger, but decreased hunger and loss of appetite.     Kymberly Edwards denies abdominal pain. The patientdoes not have vomiting or regurgitation. The patientdoes not have heartburn/reflux.    Doing well with wegovy 1.7mg. Does well and denies any AE.     Diet and Exercise: Diet history reviewed and discussed with the patient. Weight loss/gains to date discussed with the patient. She reports eating 3-4 meals per day, a typical portion size of 1 cup, eating 1 snack per day, drinking 5+ 8-oz. glasses of water per day. The patient can tolerate solid protein.   The patient is eating protein first. The patient is limiting food volume. The patient is taking vitamins. The patient is limiting snacking. The patient is regular exercise. She is drinking carbonated beverages.     The following portions of the patient's history were reviewed and updated as appropriate: allergies, current medications, past family history, past medical history, past social history, past surgical history, and problem list.    Review of Systems   All other systems reviewed and are negative.    Vitals:    04/24/25 1351   BP: 132/93   Pulse: 78   Temp: 98 °F (36.7 °C)       Physical Exam  Vitals reviewed.   Constitutional:       Appearance: Normal appearance. She is well-developed. She is  obese.   HENT:      Head: Normocephalic and atraumatic.   Cardiovascular:      Rate and Rhythm: Normal rate.   Pulmonary:      Effort: Pulmonary effort is normal.   Abdominal:      Palpations: Abdomen is soft.   Musculoskeletal:         General: Normal range of motion.   Skin:     General: Skin is warm and dry.   Neurological:      Mental Status: She is alert and oriented to person, place, and time.   Psychiatric:         Behavior: Behavior normal.         Thought Content: Thought content normal.         Judgment: Judgment normal.     Assessment: Post-operatively the patient is doing well.     Encounter Diagnoses   Name Primary?   • Obesity, Class II, BMI 35-39.9 Yes   • H/O laparoscopic adjustable gastric banding    • Dietary counseling        Plan:    Doing great with wegovy 1.7mg. Still doing well with her band without fluid.  No adjustment today. RTC for n/v/d/regurg. Encouraged patient to be sure to eat plenty of dense lean protein and high fiber foods like vegetables and fresh fruits while reducing simple carbohydrates. Reviewed the importance of eating solid foods vs. soft which will contribute to weight gain. Discussed the recommended amount of water to intake daily- half of body weight in ounces. Not drinking with or right after meals.    Activity restrictions: None.   Instructions / Recommendations: dietary counseling recommended, recommended a daily protein intake of  grams, patient was advised that the lap band system works best when consuming solid foods, vitamin supplement(s) recommended, recommended exercising at least 150 minutes per week inclduing both cardio and strength training, behavior modifications recommended and instructed to call the office for concerns, questions, or problems.     The patient was instructed to follow up in 6 months     The patient was counseled regarding the LAP-BAND and how the band works.  Total time spent face to face was 20 minutes.  The chart was reviewed prior  to starting the visit.

## 2025-06-10 RX ORDER — SEMAGLUTIDE 1.7 MG/.75ML
1.7 INJECTION, SOLUTION SUBCUTANEOUS WEEKLY
Qty: 9 ML | Refills: 1 | Status: SHIPPED | OUTPATIENT
Start: 2025-06-10 | End: 2025-09-30

## (undated) DEVICE — SENSR O2 OXIMAX FNGR A/ 18IN NONSTR

## (undated) DEVICE — KT ORCA ORCAPOD DISP STRL

## (undated) DEVICE — CANN O2 ETCO2 FITS ALL CONN CO2 SMPL A/ 7IN DISP LF

## (undated) DEVICE — TUBING, SUCTION, 1/4" X 10', STRAIGHT: Brand: MEDLINE

## (undated) DEVICE — ADAPT CLN BIOGUARD AIR/H2O DISP

## (undated) DEVICE — LN SMPL CO2 SHTRM SD STREAM W/M LUER